# Patient Record
Sex: MALE | Race: WHITE | HISPANIC OR LATINO | Employment: FULL TIME | URBAN - METROPOLITAN AREA
[De-identification: names, ages, dates, MRNs, and addresses within clinical notes are randomized per-mention and may not be internally consistent; named-entity substitution may affect disease eponyms.]

---

## 2017-03-23 ENCOUNTER — ALLSCRIPTS OFFICE VISIT (OUTPATIENT)
Dept: OTHER | Facility: OTHER | Age: 52
End: 2017-03-23

## 2018-01-14 VITALS
SYSTOLIC BLOOD PRESSURE: 118 MMHG | BODY MASS INDEX: 26.67 KG/M2 | TEMPERATURE: 98.5 F | WEIGHT: 156.2 LBS | DIASTOLIC BLOOD PRESSURE: 76 MMHG | HEIGHT: 64 IN | HEART RATE: 84 BPM

## 2018-11-02 ENCOUNTER — OFFICE VISIT (OUTPATIENT)
Dept: FAMILY MEDICINE CLINIC | Facility: CLINIC | Age: 53
End: 2018-11-02
Payer: COMMERCIAL

## 2018-11-02 VITALS
SYSTOLIC BLOOD PRESSURE: 118 MMHG | HEART RATE: 72 BPM | BODY MASS INDEX: 26.4 KG/M2 | HEIGHT: 64 IN | WEIGHT: 154.6 LBS | DIASTOLIC BLOOD PRESSURE: 76 MMHG | TEMPERATURE: 98.4 F

## 2018-11-02 DIAGNOSIS — J30.89 NON-SEASONAL ALLERGIC RHINITIS, UNSPECIFIED TRIGGER: ICD-10-CM

## 2018-11-02 DIAGNOSIS — Z23 NEED FOR INFLUENZA VACCINATION: Primary | ICD-10-CM

## 2018-11-02 DIAGNOSIS — J01.00 ACUTE NON-RECURRENT MAXILLARY SINUSITIS: ICD-10-CM

## 2018-11-02 DIAGNOSIS — Z23 NEED FOR VACCINATION AGAINST STREPTOCOCCUS PNEUMONIAE: ICD-10-CM

## 2018-11-02 DIAGNOSIS — J45.20 MILD INTERMITTENT ASTHMA WITHOUT COMPLICATION: ICD-10-CM

## 2018-11-02 PROCEDURE — 99213 OFFICE O/P EST LOW 20 MIN: CPT | Performed by: FAMILY MEDICINE

## 2018-11-02 PROCEDURE — 1036F TOBACCO NON-USER: CPT | Performed by: FAMILY MEDICINE

## 2018-11-02 PROCEDURE — 90732 PPSV23 VACC 2 YRS+ SUBQ/IM: CPT

## 2018-11-02 PROCEDURE — 3008F BODY MASS INDEX DOCD: CPT | Performed by: FAMILY MEDICINE

## 2018-11-02 PROCEDURE — 90471 IMMUNIZATION ADMIN: CPT

## 2018-11-02 PROCEDURE — 90682 RIV4 VACC RECOMBINANT DNA IM: CPT

## 2018-11-02 PROCEDURE — 90472 IMMUNIZATION ADMIN EACH ADD: CPT

## 2018-11-02 RX ORDER — ALBUTEROL SULFATE 90 UG/1
2 AEROSOL, METERED RESPIRATORY (INHALATION) EVERY 4 HOURS PRN
Qty: 1 INHALER | Refills: 11 | Status: SHIPPED | OUTPATIENT
Start: 2018-11-02 | End: 2018-12-28 | Stop reason: SDUPTHER

## 2018-11-02 RX ORDER — TRIAMCINOLONE ACETONIDE 55 UG/1
2 SPRAY, METERED NASAL DAILY
Qty: 360 ACT | Refills: 0
Start: 2018-11-02 | End: 2018-12-28

## 2018-11-02 RX ORDER — AZITHROMYCIN 250 MG/1
TABLET, FILM COATED ORAL
Qty: 6 TABLET | Refills: 0 | Status: SHIPPED | OUTPATIENT
Start: 2018-11-02 | End: 2018-11-07

## 2018-11-02 RX ORDER — CETIRIZINE HYDROCHLORIDE 10 MG/1
10 TABLET ORAL DAILY
COMMUNITY
End: 2019-05-09 | Stop reason: ALTCHOICE

## 2018-11-02 RX ORDER — GUAIFENESIN 600 MG
1200 TABLET, EXTENDED RELEASE 12 HR ORAL EVERY 12 HOURS SCHEDULED
Qty: 40 TABLET | Refills: 0
Start: 2018-11-02 | End: 2018-11-12

## 2018-11-02 NOTE — PATIENT INSTRUCTIONS
Problem List Items Addressed This Visit        Respiratory    Allergic rhinitis - Primary     Patient does have some allergic rhinitis symptoms  It is likely that this is triggering some of the sinus symptoms that he also has  Would recommend Rhinocort, Nasonex, Nasacort, Flonase  He should choose 1, and use that till the 1st hard frost          Relevant Medications    Triamcinolone Acetonide 55 MCG/ACT AERO    Asthma     Asthma seems to be doing well at this point  If he keeps on top of the allergies, I would expect that he will do extremely well with the asthma  He did have side effects previously with Singulair, so we will trial just the nasal steroids, as well as having as needed albuterol available  Relevant Medications    albuterol (VENTOLIN HFA) 90 mcg/act inhaler      Other Visit Diagnoses     Acute non-recurrent maxillary sinusitis        Patient does have what appears to be a sinus infection along with allergies  Recommend Zithromax, Mucinex over-the-counter      Relevant Medications    Triamcinolone Acetonide 55 MCG/ACT AERO    azithromycin (ZITHROMAX) 250 mg tablet    guaiFENesin (MUCINEX) 600 mg 12 hr tablet

## 2018-11-02 NOTE — PROGRESS NOTES
Assessment/Plan:    Allergic rhinitis  Patient does have some allergic rhinitis symptoms  It is likely that this is triggering some of the sinus symptoms that he also has  Would recommend Rhinocort, Nasonex, Nasacort, Flonase  He should choose 1, and use that till the 1st hard frost     Asthma  Asthma seems to be doing well at this point  If he keeps on top of the allergies, I would expect that he will do extremely well with the asthma  He did have side effects previously with Singulair, so we will trial just the nasal steroids, as well as having as needed albuterol available  Diagnoses and all orders for this visit:    Non-seasonal allergic rhinitis, unspecified trigger  -     Triamcinolone Acetonide 55 MCG/ACT AERO; 2 Act (110 mcg total) into each nostril daily for 180 days    Mild intermittent asthma without complication  -     albuterol (VENTOLIN HFA) 90 mcg/act inhaler; Inhale 2 puffs every 4 (four) hours as needed for wheezing or shortness of breath    Acute non-recurrent maxillary sinusitis  Comments:  Patient does have what appears to be a sinus infection along with allergies  Recommend Zithromax, Mucinex over-the-counter  Orders:  -     Triamcinolone Acetonide 55 MCG/ACT AERO; 2 Act (110 mcg total) into each nostril daily for 180 days  -     azithromycin (ZITHROMAX) 250 mg tablet; Take 2 tablets on day 1, then 1 tablet daily days 2 through 5  -     guaiFENesin (MUCINEX) 600 mg 12 hr tablet; Take 2 tablets (1,200 mg total) by mouth every 12 (twelve) hours for 10 days    Other orders  -     cetirizine (ZyrTEC) 10 mg tablet; Take 10 mg by mouth daily          Subjective: pt is here for headache, fever, congetion,post nasal drip for approx  4 weeks~cd     Patient ID: Liza Kohler is a 46 y o  male  For the last month or so, the patient has been having a significant amount of congestion in the head, nasal congestion, postnasal drip  Runny nose is also present with this    Some face or tooth pain, occasional body aches  Has used Motrin so far  He has also tried Zyrtec  There has been a significant amount of symptoms for a while now that have been bothering him for this  Symptoms are the worst in the morning, but is present throughout the day  Asthma has been OK  He has not needed MDI's  Last use was a long time ago  The following portions of the patient's history were reviewed and updated as appropriate: allergies, current medications, past family history, past medical history, past social history, past surgical history and problem list     Review of Systems   Constitutional: Negative  HENT:        Per HPI   Respiratory: Positive for cough, shortness of breath and wheezing  Cardiovascular: Negative  Gastrointestinal: Negative  Objective:  Vitals:    11/02/18 0907   BP: 118/76   BP Location: Left arm   Patient Position: Sitting   Cuff Size: Standard   Pulse: 72   Temp: 98 4 °F (36 9 °C)   TempSrc: Oral   Weight: 70 1 kg (154 lb 9 6 oz)   Height: 5' 4" (1 626 m)                Physical Exam   Constitutional: He appears well-developed and well-nourished  HENT:   Head: Normocephalic and atraumatic  Right Ear: Hearing, tympanic membrane, external ear and ear canal normal    Left Ear: Hearing, tympanic membrane, external ear and ear canal normal    Some minor sinus tenderness, otherwise normal on head exam    Neck: Normal range of motion  Neck supple  Cardiovascular: Normal rate, regular rhythm and normal heart sounds  Exam reveals no gallop and no friction rub  No murmur heard  Pulses:       Carotid pulses are 2+ on the right side, and 2+ on the left side  Pulmonary/Chest: Effort normal and breath sounds normal  No respiratory distress  He has no wheezes  He has no rales  Nursing note and vitals reviewed

## 2018-11-02 NOTE — ASSESSMENT & PLAN NOTE
Asthma seems to be doing well at this point  If he keeps on top of the allergies, I would expect that he will do extremely well with the asthma  He did have side effects previously with Singulair, so we will trial just the nasal steroids, as well as having as needed albuterol available

## 2018-11-02 NOTE — ASSESSMENT & PLAN NOTE
Patient does have some allergic rhinitis symptoms  It is likely that this is triggering some of the sinus symptoms that he also has  Would recommend Rhinocort, Nasonex, Nasacort, Flonase    He should choose 1, and use that till the 1st hard frost

## 2018-12-28 ENCOUNTER — OFFICE VISIT (OUTPATIENT)
Dept: FAMILY MEDICINE CLINIC | Facility: CLINIC | Age: 53
End: 2018-12-28
Payer: COMMERCIAL

## 2018-12-28 ENCOUNTER — HOSPITAL ENCOUNTER (EMERGENCY)
Facility: HOSPITAL | Age: 53
Discharge: HOME | End: 2018-12-29
Attending: EMERGENCY MEDICINE | Admitting: EMERGENCY MEDICINE
Payer: COMMERCIAL

## 2018-12-28 ENCOUNTER — APPOINTMENT (EMERGENCY)
Dept: RADIOLOGY | Facility: HOSPITAL | Age: 53
End: 2018-12-28
Payer: COMMERCIAL

## 2018-12-28 ENCOUNTER — TELEPHONE (OUTPATIENT)
Dept: FAMILY MEDICINE CLINIC | Facility: CLINIC | Age: 53
End: 2018-12-28

## 2018-12-28 VITALS
HEIGHT: 64 IN | RESPIRATION RATE: 16 BRPM | SYSTOLIC BLOOD PRESSURE: 110 MMHG | DIASTOLIC BLOOD PRESSURE: 66 MMHG | TEMPERATURE: 97.9 F | BODY MASS INDEX: 26.8 KG/M2 | HEART RATE: 72 BPM | WEIGHT: 157 LBS

## 2018-12-28 DIAGNOSIS — J45.21 MILD INTERMITTENT ASTHMA WITH ACUTE EXACERBATION: Primary | ICD-10-CM

## 2018-12-28 DIAGNOSIS — J45.20 MILD INTERMITTENT ASTHMA WITHOUT COMPLICATION: ICD-10-CM

## 2018-12-28 DIAGNOSIS — J45.901 ASTHMA EXACERBATION: Primary | ICD-10-CM

## 2018-12-28 DIAGNOSIS — J20.9 ACUTE BRONCHITIS, UNSPECIFIED ORGANISM: ICD-10-CM

## 2018-12-28 LAB
ALBUMIN SERPL BCP-MCNC: 4.1 G/DL (ref 3.5–5)
ALP SERPL-CCNC: 79 U/L (ref 46–116)
ALT SERPL W P-5'-P-CCNC: 25 U/L (ref 12–78)
ANION GAP SERPL CALCULATED.3IONS-SCNC: 7 MMOL/L (ref 4–13)
AST SERPL W P-5'-P-CCNC: 17 U/L (ref 5–45)
BASOPHILS # BLD AUTO: 0.05 THOUSANDS/ΜL (ref 0–0.1)
BASOPHILS NFR BLD AUTO: 0 % (ref 0–1)
BILIRUB SERPL-MCNC: 0.48 MG/DL (ref 0.2–1)
BUN SERPL-MCNC: 19 MG/DL (ref 5–25)
CALCIUM SERPL-MCNC: 9.5 MG/DL (ref 8.3–10.1)
CHLORIDE SERPL-SCNC: 107 MMOL/L (ref 100–108)
CO2 SERPL-SCNC: 26 MMOL/L (ref 21–32)
CREAT SERPL-MCNC: 1.23 MG/DL (ref 0.6–1.3)
EOSINOPHIL # BLD AUTO: 0.01 THOUSAND/ΜL (ref 0–0.61)
EOSINOPHIL NFR BLD AUTO: 0 % (ref 0–6)
ERYTHROCYTE [DISTWIDTH] IN BLOOD BY AUTOMATED COUNT: 11.2 % (ref 11.6–15.1)
GFR SERPL CREATININE-BSD FRML MDRD: 66 ML/MIN/1.73SQ M
GLUCOSE SERPL-MCNC: 133 MG/DL (ref 65–140)
HCT VFR BLD AUTO: 43.5 % (ref 36.5–49.3)
HGB BLD-MCNC: 14.2 G/DL (ref 12–17)
IMM GRANULOCYTES # BLD AUTO: 0.07 THOUSAND/UL (ref 0–0.2)
IMM GRANULOCYTES NFR BLD AUTO: 0 % (ref 0–2)
LYMPHOCYTES # BLD AUTO: 0.51 THOUSANDS/ΜL (ref 0.6–4.47)
LYMPHOCYTES NFR BLD AUTO: 3 % (ref 14–44)
MCH RBC QN AUTO: 28.1 PG (ref 26.8–34.3)
MCHC RBC AUTO-ENTMCNC: 32.6 G/DL (ref 31.4–37.4)
MCV RBC AUTO: 86 FL (ref 82–98)
MONOCYTES # BLD AUTO: 0.42 THOUSAND/ΜL (ref 0.17–1.22)
MONOCYTES NFR BLD AUTO: 3 % (ref 4–12)
NEUTROPHILS # BLD AUTO: 14.65 THOUSANDS/ΜL (ref 1.85–7.62)
NEUTS SEG NFR BLD AUTO: 94 % (ref 43–75)
NRBC BLD AUTO-RTO: 0 /100 WBCS
PLATELET # BLD AUTO: 250 THOUSANDS/UL (ref 149–390)
PMV BLD AUTO: 10.7 FL (ref 8.9–12.7)
POTASSIUM SERPL-SCNC: 4.3 MMOL/L (ref 3.5–5.3)
PROT SERPL-MCNC: 7.8 G/DL (ref 6.4–8.2)
RBC # BLD AUTO: 5.06 MILLION/UL (ref 3.88–5.62)
SODIUM SERPL-SCNC: 140 MMOL/L (ref 136–145)
TROPONIN I SERPL-MCNC: <0.02 NG/ML
WBC # BLD AUTO: 15.71 THOUSAND/UL (ref 4.31–10.16)

## 2018-12-28 PROCEDURE — 96374 THER/PROPH/DIAG INJ IV PUSH: CPT

## 2018-12-28 PROCEDURE — 80053 COMPREHEN METABOLIC PANEL: CPT | Performed by: EMERGENCY MEDICINE

## 2018-12-28 PROCEDURE — 71046 X-RAY EXAM CHEST 2 VIEWS: CPT

## 2018-12-28 PROCEDURE — 84484 ASSAY OF TROPONIN QUANT: CPT | Performed by: EMERGENCY MEDICINE

## 2018-12-28 PROCEDURE — 85025 COMPLETE CBC W/AUTO DIFF WBC: CPT | Performed by: EMERGENCY MEDICINE

## 2018-12-28 PROCEDURE — 93005 ELECTROCARDIOGRAM TRACING: CPT

## 2018-12-28 PROCEDURE — 99213 OFFICE O/P EST LOW 20 MIN: CPT | Performed by: FAMILY MEDICINE

## 2018-12-28 PROCEDURE — 99284 EMERGENCY DEPT VISIT MOD MDM: CPT

## 2018-12-28 PROCEDURE — 94640 AIRWAY INHALATION TREATMENT: CPT

## 2018-12-28 PROCEDURE — 36415 COLL VENOUS BLD VENIPUNCTURE: CPT | Performed by: EMERGENCY MEDICINE

## 2018-12-28 PROCEDURE — 96361 HYDRATE IV INFUSION ADD-ON: CPT

## 2018-12-28 RX ORDER — ALBUTEROL SULFATE 2.5 MG/3ML
5 SOLUTION RESPIRATORY (INHALATION) ONCE
Status: COMPLETED | OUTPATIENT
Start: 2018-12-28 | End: 2018-12-28

## 2018-12-28 RX ORDER — AZITHROMYCIN 500 MG/1
500 TABLET, FILM COATED ORAL DAILY
Qty: 5 TABLET | Refills: 0 | Status: SHIPPED | OUTPATIENT
Start: 2018-12-28 | End: 2019-01-02 | Stop reason: ALTCHOICE

## 2018-12-28 RX ORDER — PREDNISONE 20 MG/1
20 TABLET ORAL DAILY
Qty: 5 TABLET | Refills: 0 | Status: SHIPPED | OUTPATIENT
Start: 2018-12-28 | End: 2019-01-02 | Stop reason: ALTCHOICE

## 2018-12-28 RX ORDER — ALBUTEROL SULFATE 90 UG/1
2 AEROSOL, METERED RESPIRATORY (INHALATION) EVERY 6 HOURS PRN
Qty: 1 INHALER | Refills: 2 | Status: SHIPPED | OUTPATIENT
Start: 2018-12-28 | End: 2019-12-28

## 2018-12-28 RX ORDER — METHYLPREDNISOLONE SODIUM SUCCINATE 125 MG/2ML
125 INJECTION, POWDER, LYOPHILIZED, FOR SOLUTION INTRAMUSCULAR; INTRAVENOUS ONCE
Status: COMPLETED | OUTPATIENT
Start: 2018-12-28 | End: 2018-12-28

## 2018-12-28 RX ADMIN — SODIUM CHLORIDE 1000 ML: 0.9 INJECTION, SOLUTION INTRAVENOUS at 22:18

## 2018-12-28 RX ADMIN — IPRATROPIUM BROMIDE 0.5 MG: 0.5 SOLUTION RESPIRATORY (INHALATION) at 22:18

## 2018-12-28 RX ADMIN — METHYLPREDNISOLONE SODIUM SUCCINATE 125 MG: 125 INJECTION, POWDER, FOR SOLUTION INTRAMUSCULAR; INTRAVENOUS at 22:18

## 2018-12-28 RX ADMIN — ALBUTEROL SULFATE 5 MG: 2.5 SOLUTION RESPIRATORY (INHALATION) at 22:18

## 2018-12-28 NOTE — PATIENT INSTRUCTIONS

## 2018-12-28 NOTE — PROGRESS NOTES
Assessment/Plan:     Diagnoses and all orders for this visit:    Mild intermittent asthma with acute exacerbation  Comments:  Mild exacerbation, to start steroid if not better and antibiotic, advised on well hydration, to return to the office if worsening to continue with albuterol inh  Orders:  -     azithromycin (ZITHROMAX) 500 MG tablet; Take 1 tablet (500 mg total) by mouth daily for 5 days    Acute bronchitis, unspecified organism  Comments:  Started on a Z-Jamal empirically  To return to the office if not better  Orders:  -     predniSONE 20 mg tablet; Take 1 tablet (20 mg total) by mouth daily          Subjective: Pt here with complains of asthma attack flare up  Pt states he has been using his inhaler more often  GEO Velasco     Patient ID: Marta Tamez is a 46 y o  male  Chest congestion for the last 5 days , pt using his inhaler more frequently, no fever ir   chills ,feel chest tightness   Pt has nasal congestion, ear plugged , sore throat,   Worsen at night   Pt with h/o asthma ,no recent exacerbation  The following portions of the patient's history were reviewed and updated as appropriate: allergies, current medications, past family history, past medical history, past social history, past surgical history and problem list     Review of Systems   Constitutional: Positive for activity change, appetite change, fatigue and fever  HENT: Positive for ear pain, postnasal drip, rhinorrhea, sinus pain, sinus pressure, sore throat and voice change  Negative for congestion, facial swelling, hearing loss, mouth sores, sneezing and trouble swallowing  Respiratory: Positive for cough  Negative for wheezing  Cardiovascular: Negative for chest pain and leg swelling  Gastrointestinal: Negative for abdominal pain, blood in stool, diarrhea, nausea and vomiting  Musculoskeletal: Negative for back pain, joint swelling, myalgias and neck pain  Skin: Negative for color change and rash     Neurological: Negative for headaches  Objective:      Vitals:    12/28/18 1022   BP: 110/66   BP Location: Left arm   Patient Position: Sitting   Cuff Size: Standard   Pulse: 72   Resp: 16   Temp: 97 9 °F (36 6 °C)   TempSrc: Tympanic   Weight: 71 2 kg (157 lb)   Height: 5' 4" (1 626 m)      Physical Exam   Constitutional: He is oriented to person, place, and time  He appears well-developed and well-nourished  HENT:   Head: Normocephalic and atraumatic  Right Ear: Hearing, tympanic membrane, external ear and ear canal normal    Left Ear: Hearing, tympanic membrane, external ear and ear canal normal    Nose: No rhinorrhea, sinus tenderness or nasal deformity  Right sinus exhibits no maxillary sinus tenderness and no frontal sinus tenderness  Left sinus exhibits no maxillary sinus tenderness and no frontal sinus tenderness  Mouth/Throat: Mucous membranes are normal  Posterior oropharyngeal edema and posterior oropharyngeal erythema present  No oropharyngeal exudate or tonsillar abscesses  Eyes: Conjunctivae are normal    Neck: Normal range of motion  Neck supple  Cardiovascular: Normal rate, regular rhythm and normal heart sounds  Pulmonary/Chest: Effort normal and breath sounds normal    Abdominal: Bowel sounds are normal    Musculoskeletal: Normal range of motion  Neurological: He is alert and oriented to person, place, and time  Skin: Skin is warm and dry  Psychiatric: He has a normal mood and affect  Nursing note and vitals reviewed

## 2018-12-28 NOTE — TELEPHONE ENCOUNTER
Patient got home after appointment and realized he was a lot lower than he thought with his Albuterol  He was wondering if this could be called into the [facility] on [address] [facility]  Thank you!

## 2018-12-28 NOTE — LETTER
December 28, 2018     Patient: Елена Gustafson   YOB: 1965   Date of Visit: 12/28/2018       To Whom it May Concern:    Елена Gustafson is under my professional care  He was seen in my office on 12/28/2018  He may return to work on 1/2/2019  If you have any questions or concerns, please don't hesitate to call           Sincerely,          Mario Ramos MD        CC: No Recipients

## 2018-12-29 VITALS
HEIGHT: 64 IN | OXYGEN SATURATION: 96 % | RESPIRATION RATE: 18 BRPM | TEMPERATURE: 97.8 F | DIASTOLIC BLOOD PRESSURE: 59 MMHG | WEIGHT: 157 LBS | BODY MASS INDEX: 26.8 KG/M2 | HEART RATE: 116 BPM | SYSTOLIC BLOOD PRESSURE: 105 MMHG

## 2018-12-29 VITALS
TEMPERATURE: 98 F | OXYGEN SATURATION: 94 % | WEIGHT: 157 LBS | HEART RATE: 120 BPM | RESPIRATION RATE: 20 BRPM | BODY MASS INDEX: 26.8 KG/M2 | HEIGHT: 64 IN | SYSTOLIC BLOOD PRESSURE: 129 MMHG | DIASTOLIC BLOOD PRESSURE: 75 MMHG

## 2018-12-29 DIAGNOSIS — R00.2 PALPITATIONS: Primary | ICD-10-CM

## 2018-12-29 LAB
ATRIAL RATE: 101 BPM
ATRIAL RATE: 113 BPM
DEPRECATED D DIMER PPP: 235 NG/ML (FEU)
P AXIS: 47 DEGREES
P AXIS: 60 DEGREES
PR INTERVAL: 118 MS
PR INTERVAL: 118 MS
QRS AXIS: -2 DEGREES
QRS AXIS: 29 DEGREES
QRSD INTERVAL: 72 MS
QRSD INTERVAL: 84 MS
QT INTERVAL: 312 MS
QT INTERVAL: 326 MS
QTC INTERVAL: 420 MS
QTC INTERVAL: 427 MS
T WAVE AXIS: 26 DEGREES
T WAVE AXIS: 52 DEGREES
VENTRICULAR RATE: 100 BPM
VENTRICULAR RATE: 113 BPM

## 2018-12-29 PROCEDURE — 99285 EMERGENCY DEPT VISIT HI MDM: CPT

## 2018-12-29 PROCEDURE — 93005 ELECTROCARDIOGRAM TRACING: CPT

## 2018-12-29 PROCEDURE — 85379 FIBRIN DEGRADATION QUANT: CPT | Performed by: EMERGENCY MEDICINE

## 2018-12-29 PROCEDURE — 93010 ELECTROCARDIOGRAM REPORT: CPT | Performed by: INTERNAL MEDICINE

## 2018-12-29 PROCEDURE — 36415 COLL VENOUS BLD VENIPUNCTURE: CPT | Performed by: EMERGENCY MEDICINE

## 2018-12-29 RX ORDER — PREDNISONE 20 MG/1
40 TABLET ORAL DAILY
Qty: 10 TABLET | Refills: 0 | Status: SHIPPED | OUTPATIENT
Start: 2018-12-29 | End: 2019-01-02 | Stop reason: ALTCHOICE

## 2018-12-29 RX ORDER — ALBUTEROL SULFATE 2.5 MG/3ML
5 SOLUTION RESPIRATORY (INHALATION) ONCE
Status: COMPLETED | OUTPATIENT
Start: 2018-12-29 | End: 2018-12-29

## 2018-12-29 RX ORDER — ALBUTEROL SULFATE 2.5 MG/3ML
2.5 SOLUTION RESPIRATORY (INHALATION) EVERY 6 HOURS PRN
Qty: 75 ML | Refills: 0 | Status: SHIPPED | OUTPATIENT
Start: 2018-12-29 | End: 2019-01-02 | Stop reason: SDUPTHER

## 2018-12-29 RX ADMIN — IPRATROPIUM BROMIDE 0.5 MG: 0.5 SOLUTION RESPIRATORY (INHALATION) at 00:58

## 2018-12-29 RX ADMIN — ALBUTEROL SULFATE 5 MG: 2.5 SOLUTION RESPIRATORY (INHALATION) at 00:58

## 2018-12-29 NOTE — DISCHARGE INSTRUCTIONS
Asthma, Ambulatory Care   GENERAL INFORMATION:   Asthma  is a lung disease that makes breathing difficult  Chronic inflammation and reactions to triggers narrow the airways in your lungs  Asthma can become life-threatening if it is not managed  Common symptoms include the following:   · Coughing     · Wheezing     · Shortness of breath     · Chest tightness  Seek immediate care for the following symptoms:   · Severe shortness of breath    · Blue or gray lips or nails    · Skin around your neck and ribs pulls in with each breath    · Shortness of breath, even after you take your short-term medicine as directed     · Peak flow numbers in the red zone of your asthma action plan  Treatment for asthma  will depend on how severe it is  Medicine may decrease inflammation, open airways, and make it easier to breathe  Medicines may be inhaled, taken as a pill, or injected  Short-term medicines relieve your symptoms quickly  Long-term medicines are used to prevent future attacks  You may also need medicine to help control your allergies  Manage and prevent future asthma attacks:   · Follow your asthma action pan  This is a written plan that you and your healthcare provider create  It explains which medicine you need and when to change doses if necessary  It also explains how you can monitor symptoms and use a peak flow meter  The meter measures how well your lungs are working  · Manage other health conditions , such as allergies, acid reflux, and sleep apnea  · Identify and avoid triggers  These may include pets, dust mites, mold, and cockroaches  · Do not smoke and avoid others who smoke  If you smoke, it is never too late to quit  Ask your healthcare provider if you need help quitting  · Ask about a flu vaccine  The flu can make your asthma worse  You may need a yearly flu shot  Follow up with your healthcare provider as directed:   You will need to return to make sure your medicine is working and your symptoms are controlled  You may be referred to an asthma or allergy specialist  Aurora Dwyer may be asked to keep a record of your peak flow values and bring it with you to your appointments  Write down your questions so you remember to ask them during your visits  CARE AGREEMENT:   You have the right to help plan your care  Learn about your health condition and how it may be treated  Discuss treatment options with your caregivers to decide what care you want to receive  You always have the right to refuse treatment  The above information is an  only  It is not intended as medical advice for individual conditions or treatments  Talk to your doctor, nurse or pharmacist before following any medical regimen to see if it is safe and effective for you  © 2014 [address] is for End User's use only and may not be sold, redistributed or otherwise used for commercial purposes  All illustrations and images included in CareNotes® are the copyrighted property of [employer]  or [employer] Ley[employer]

## 2018-12-29 NOTE — ED ATTENDING ATTESTATION
Mikie Michaels MD, saw and evaluated the patient  I have discussed the patient with the resident/non-physician practitioner and agree with the resident's/non-physician practitioner's findings, Plan of Care, and MDM as documented in the resident's/non-physician practitioner's note, except where noted  All available labs and Radiology studies were reviewed  At this point I agree with the current assessment done in the Emergency Department  I have conducted an independent evaluation of this patient a history and physical is as follows:    Patient here with rapid heart rate  Patient was seen last night for asthma exacerbation, given 10 mg of albuterol  Was discharged 5 hr ago  Since that time, patient has had ongoing rapid heart rate  His dyspnea is improved, but still mildly present  The patient states his asthma is usually not this bad  Denies fevers, chest pain, or lateralizing limb swelling, or PE risk factors  His review of systems is negative in 12 systems reviewed  On exam the patient is tachycardic with an otherwise nonfocal exam   Impression:  Sinus tachycardia    Will plan to do D-dimer, observe, EKG  Critical Care Time  CritCare Time    Procedures

## 2018-12-29 NOTE — ED ATTENDING ATTESTATION
Britton Tapia MD, saw and evaluated the patient  All available labs and X-rays were ordered by me or the resident and have been reviewed by myself  I discussed the patient with the resident / non-physician and agree with the resident's / non-physician practitioner's findings and plan as documented in the resident's / non-physician practicitioner's note, except where noted  At this point, I agree with the current assessment done in the ED  Chief Complaint   Patient presents with    Asthma     Pt states he was at the family doctor earlier today and was given prednisone and inhaler, but it is not helping,  Started with difficulty breathing on [place] stephanie and has gotten worse since then  This is a 58-year-old male presenting for evaluation of shortness of breath  The patient states that he has about a 5 or 7 year history of asthma  He would use a puffer every now and then  He was doing well until [place] Stephanie when he started to have cough congestion and sensation of chest tightness with shortness of breath  It has been continuous from then until now, feels much better if he is walking around and exerting himself  He feels a little bit worse if he is lying flat  He denies any fevers chills nausea vomiting  Denies chest pain, just feeling of chest tightness like he has had during previous asthma exacerbations  Denies arm pain jaw pain back pain  Denies leg swelling  Denies new medications  Denies long drives  Denies smoking drinking or drugs  He was seen in the office today and was diagnosed mild intermittent acute asthma and treated with azithromycin, prednisone  The patient had no improvement in symptoms and so came in here for evaluation    PE:  Vitals:    12/28/18 2230 12/28/18 2300 12/29/18 0015 12/29/18 0100   BP: (!) 191/104 158/84 146/83 129/75   BP Location: Left arm Right arm Right arm Right arm   Pulse: 94 95 (!) 122 (!) 120   Resp: (!) 23 21 19 20   Temp:       TempSrc: SpO2: 99% 96% 95% 94%   Weight:       Height:       General: VSS, NAD, awake, alert  Well-nourished, well-developed  Appears stated age  Speaking normally in full sentences  Head: Normocephalic, atraumatic, nontender  Eyes: PERRL, EOM-I  No diplopia  No hyphema  No subconjunctival hemorrhages  Symmetrical lids  ENT: Atraumatic external nose and ears  MMM  No malocclusion  No stridor  Normal phonation  No drooling  Normal swallowing  Neck: Symmetric, trachea midline  No JVD  CV: tachycardic () +S1/S2  No murmurs or gallops  Peripheral pulses +2 throughout  No chest wall tenderness  Lungs:   Unlabored No retractions  Mild wheezing posteriorly but poor air movement  lungs sounds equal bilateral    No tachypnea  Abd: +BS, soft, NT/ND    MSK:   FROM   Back:   No rashes  Skin: Dry, intact  Neuro: AAOx3, GCS 15, CN II-XII grossly intact  Motor grossly intact  Psychiatric/Behavioral: Appropriate mood and affect   Exam: deferred  A:  - dyspnea  - chest tightness  P:  - steroids  - nebs  - CXR  - labs (cardiac)  - re-eval    - 13 point ROS was performed and all are normal unless stated in the history above  - Nursing note reviewed  Vitals reviewed  - Orders placed by myself and/or advanced practitioner / resident     - Previous chart was reviewed  - No language barrier    - History obtained from patient  - There are no limitations to the history obtained  - Critical care time: Not applicable for this patient  Final Diagnosis:  1   Asthma exacerbation         Medications   albuterol inhalation solution 5 mg (5 mg Nebulization Given 12/28/18 2218)   ipratropium (ATROVENT) 0 02 % inhalation solution 0 5 mg (0 5 mg Nebulization Given 12/28/18 2218)   methylPREDNISolone sodium succinate (Solu-MEDROL) injection 125 mg (125 mg Intravenous Given 12/28/18 2218)   sodium chloride 0 9 % bolus 1,000 mL (0 mL Intravenous Stopped 12/29/18 0010)   albuterol inhalation solution 5 mg (5 mg Nebulization Given 12/29/18 0058)   ipratropium (ATROVENT) 0 02 % inhalation solution 0 5 mg (0 5 mg Nebulization Given 12/29/18 0058)     XR chest 2 views   ED Interpretation   No active pulmonary disease      Final Result      No acute cardiopulmonary disease  Workstation performed: [id number]           Orders Placed This Encounter   Procedures    XR chest 2 views    CBC and differential    Comprehensive metabolic panel    Troponin I    ECG 12 lead    ECG 12 lead     Labs Reviewed   CBC AND DIFFERENTIAL - Abnormal        Result Value Ref Range Status    WBC 15 71 (*) 4 31 - 10 16 Thousand/uL Final    RBC 5 06  3 88 - 5 62 Million/uL Final    Hemoglobin 14 2  12 0 - 17 0 g/dL Final    Hematocrit 43 5  36 5 - 49 3 % Final    MCV 86  82 - 98 fL Final    MCH 28 1  26 8 - 34 3 pg Final    MCHC 32 6  31 4 - 37 4 g/dL Final    RDW 11 2 (*) 11 6 - 15 1 % Final    MPV 10 7  8 9 - 12 7 fL Final    Platelets 269  408 - 390 Thousands/uL Final    nRBC 0  /100 WBCs Final    Comment: This is an appended report  These results have been appended to a previously preliminary verified report  Neutrophils Relative 94 (*) 43 - 75 % Final    Immat GRANS % 0  0 - 2 % Final    Lymphocytes Relative 3 (*) 14 - 44 % Final    Monocytes Relative 3 (*) 4 - 12 % Final    Eosinophils Relative 0  0 - 6 % Final    Basophils Relative 0  0 - 1 % Final    Neutrophils Absolute 14 65 (*) 1 85 - 7 62 Thousands/µL Final    Immature Grans Absolute 0 07  0 00 - 0 20 Thousand/uL Final    Lymphocytes Absolute 0 51 (*) 0 60 - 4 47 Thousands/µL Final    Monocytes Absolute 0 42  0 17 - 1 22 Thousand/µL Final    Eosinophils Absolute 0 01  0 00 - 0 61 Thousand/µL Final    Basophils Absolute 0 05  0 00 - 0 10 Thousands/µL Final    Narrative: This is an appended report  These results have been appended to a previously verified report     TROPONIN I - Normal    Troponin I <0 02  <=0 04 ng/mL Final    Comment:   [employer] Chemistry analyzer 99% cutoff is > 0 04 ng/mL in network labs     o cTnI 99% cutoff is useful only when applied to patients in the clinical setting of myocardial ischemia   o cTnI 99% cutoff should be interpreted in the context of clinical history, ECG findings and possibly cardiac imaging to establish correct diagnosis  o cTnI 99% cutoff may be suggestive but clearly not indicative of a coronary event without the clinical setting of myocardial ischemia  COMPREHENSIVE METABOLIC PANEL    Sodium 007  136 - 145 mmol/L Final    Potassium 4 3  3 5 - 5 3 mmol/L Final    Chloride 107  100 - 108 mmol/L Final    CO2 26  21 - 32 mmol/L Final    ANION GAP 7  4 - 13 mmol/L Final    BUN 19  5 - 25 mg/dL Final    Creatinine 1 23  0 60 - 1 30 mg/dL Final    Comment: Standardized to IDMS reference method    Glucose 133  65 - 140 mg/dL Final    Comment:   If the patient is fasting, the ADA then defines impaired fasting glucose as > 100 mg/dL and diabetes as > or equal to 123 mg/dL  Specimen collection should occur prior to Sulfasalazine administration due to the potential for falsely depressed results  Specimen collection should occur prior to Sulfapyridine administration due to the potential for falsely elevated results  Calcium 9 5  8 3 - 10 1 mg/dL Final    AST 17  5 - 45 U/L Final    Comment:   Specimen collection should occur prior to Sulfasalazine administration due to the potential for falsely depressed results  ALT 25  12 - 78 U/L Final    Comment:   Specimen collection should occur prior to Sulfasalazine and/or Sulfapyridine administration due to the potential for falsely depressed results       Alkaline Phosphatase 79  46 - 116 U/L Final    Total Protein 7 8  6 4 - 8 2 g/dL Final    Albumin 4 1  3 5 - 5 0 g/dL Final    Total Bilirubin 0 48  0 20 - 1 00 mg/dL Final    eGFR 66  ml/min/1 73sq m Final    Narrative:     National Kidney Disease Education Program recommendations are as follows:  GFR calculation is accurate only with a steady state creatinine  Chronic Kidney disease less than 60 ml/min/1 73 sq  meters  Kidney failure less than 15 ml/min/1 73 sq  meters  Time reflects when diagnosis was documented in both MDM as applicable and the Disposition within this note     Time User Action Codes Description Comment    12/29/2018  1:35 AM Kartik Valente Add [J45 901] Asthma exacerbation       ED Disposition     ED Disposition Condition Comment    Discharge  Teetee Bones discharge to home/self care  Condition at discharge: Stable        Follow-up Information     Follow up With Specialties Details Why 725 Camila Peoples MD [facility]   [address] [place] [id number]  [phone]          Discharge Medication List as of 12/29/2018  1:41 AM      START taking these medications    Details   ! ! predniSONE 20 mg tablet Take 2 tablets (40 mg total) by mouth daily for 5 days, Starting Sat 12/29/2018, Until Thu 1/3/2019, Print       !! - Potential duplicate medications found  Please discuss with provider  CONTINUE these medications which have NOT CHANGED    Details   albuterol (VENTOLIN HFA) 90 mcg/act inhaler Inhale 2 puffs every 6 (six) hours as needed for wheezing or shortness of breath, Starting Fri 12/28/2018, Until Sat 12/28/2019, Normal      azithromycin (ZITHROMAX) 500 MG tablet Take 1 tablet (500 mg total) by mouth daily for 5 days, Starting Fri 12/28/2018, Until Wed 1/2/2019, Normal      cetirizine (ZyrTEC) 10 mg tablet Take 10 mg by mouth daily, Historical Med      !! predniSONE 20 mg tablet Take 1 tablet (20 mg total) by mouth daily, Starting Fri 12/28/2018, Normal       !! - Potential duplicate medications found  Please discuss with provider  No discharge procedures on file  Prior to Admission Medications   Prescriptions Last Dose Informant Patient Reported? Taking?    albuterol (VENTOLIN HFA) 90 mcg/act inhaler   No Yes   Sig: Inhale 2 puffs every 6 (six) hours as needed for wheezing or shortness of breath   azithromycin (ZITHROMAX) 500 MG tablet   No Yes   Sig: Take 1 tablet (500 mg total) by mouth daily for 5 days   cetirizine (ZyrTEC) 10 mg tablet  Self Yes Yes   Sig: Take 10 mg by mouth daily   predniSONE 20 mg tablet   No Yes   Sig: Take 1 tablet (20 mg total) by mouth daily      Facility-Administered Medications: None       Portions of the record may have been created with voice recognition software  Occasional wrong word or "sound a like" substitutions may have occurred due to the inherent limitations of voice recognition software  Read the chart carefully and recognize, using context, where substitutions have occurred      Electronically signed by:  Shant Puckett

## 2018-12-29 NOTE — DISCHARGE INSTRUCTIONS
Continue with your previously prescribed medications including albuterol and prednisone for your previous asthma flare symptoms  Return if he developed any worsening chest pain shortness of breath fevers or any other concerning symptoms  Follow up with the primary care doctor to 3 days for re-evaluation  Can be expected to have continued palpitations or fast heart rate symptoms with the use of these medications  Heart Palpitations   WHAT YOU NEED TO KNOW:   Heart palpitations are feelings that your heart races, jumps, throbs, or flutters  You may feel extra beats, no beats for a short time, or skipped beats  You may have these feelings in your chest, throat, or neck  They may happen when you are sitting, standing, or lying  Heart palpitations may be frightening, but are usually not caused by a serious problem  DISCHARGE INSTRUCTIONS:   Call 911 or have someone else call for any of the following:   · You have any of the following signs of a heart attack:      ¨ Squeezing, pressure, or pain in your chest that lasts longer than 5 minutes or returns    ¨ Discomfort or pain in your back, neck, jaw, stomach, or arm     ¨ Trouble breathing    ¨ Nausea or vomiting    ¨ Lightheadedness or a sudden cold sweat, especially with chest pain or trouble breathing    · You have any of the following signs of a stroke:      ¨ Numbness or drooping on one side of your face     ¨ Weakness in an arm or leg    ¨ Confusion or difficulty speaking    ¨ Dizziness, a severe headache, or vision loss    · You faint or lose consciousness  Return to the emergency department if:   · Your palpitations happen more often or get more intense  Contact your healthcare provider if:   · You have new or worsening swelling in your feet or ankles  · You have questions or concerns about your condition or care  Follow up with your healthcare provider as directed:   You may need to follow up with a cardiologist  Julio Horowitz may need tests to check for heart problems that cause palpitations  Write down your questions so you remember to ask them during your visits  Keep a record:  Write down when your palpitations start and stop, what you were doing when they started, and your symptoms  Keep track of what you ate or drank within a few hours of your palpitations  Include anything that seemed to help your symptoms, such as lying down or holding your breath  This record will help you and your healthcare provider learn what triggers your palpitations  Bring this record with you to your follow up visits  Help prevent heart palpitations:   · Manage stress and anxiety  Find ways to relax such as listening to music, meditating, or doing yoga  Exercise can also help decrease stress and anxiety  Talk to someone you trust about your stress or anxiety  You can also talk to a therapist      · Get plenty of sleep every night  Ask your healthcare provider how much sleep you need each night  · Do not drink caffeine or alcohol  Caffeine and alcohol can make your palpitations worse  Caffeine is found in soda, coffee, tea, chocolate, and drinks that increase your energy  · Do not smoke  Nicotine and other chemicals in cigarettes and cigars may damage your heart and blood vessels  Ask your healthcare provider for information if you currently smoke and need help to quit  E-cigarettes or smokeless tobacco still contain nicotine  Talk to your healthcare provider before you use these products  · Do not use illegal drugs  Talk to your healthcare provider if you use illegal drugs and want help to quit  © 2017 [address] [employer] Information is for End User's use only and may not be sold, redistributed or otherwise used for commercial purposes  All illustrations and images included in CareNotes® are the copyrighted property of [employer] , [employer]  or Herb Ley[employer]  The above information is an  only   It is not intended as medical advice for individual conditions or treatments  Talk to your doctor, nurse or pharmacist before following any medical regimen to see if it is safe and effective for you

## 2018-12-29 NOTE — ED PROVIDER NOTES
History  Chief Complaint   Patient presents with    Rapid Heart Rate     pt reported "I was here about 5 hrs ago, treated with albuterol for my asthma, when I left I was jittery, but at home I felt my heart racing  they told me some of this is normal, I just got concerned"     70-year-old male history of asthma presents for rapid heart rates/palpitations  Patient was seen in the emergency department yesterday evening for an asthma flare treated with 2 treatments of 5/0 5 albuterol and ipratropium  Upon discharge his asthma has resolved, no more wheezing or shortness of breath and had a heart rate of 120  He intermittently had palpitations upon discharge but when he went home he had persistent sensation of palpitations/fast heart rate  No near syncope/syncope  No chest pain no return of wheezing or shortness of breath  He has a history of asthma however has never had to come to the hospital because of his symptoms  He does not take a maintenance inhaler but does intermittently take albuterol rescue inhaler which she has done more frequently prior to yesterday  He was seen in urgent care and given steroids and azithromycin but had no relief of symptoms  He has no history of DVT or PE or hemoptysis  No history of SVT in no family history of heart disease  Prior to Admission Medications   Prescriptions Last Dose Informant Patient Reported? Taking?    albuterol (2 5 mg/3 mL) 0 083 % nebulizer solution   No Yes   Sig: Take 1 vial (2 5 mg total) by nebulization every 6 (six) hours as needed for wheezing or shortness of breath   albuterol (VENTOLIN HFA) 90 mcg/act inhaler   No Yes   Sig: Inhale 2 puffs every 6 (six) hours as needed for wheezing or shortness of breath   azithromycin (ZITHROMAX) 500 MG tablet   No Yes   Sig: Take 1 tablet (500 mg total) by mouth daily for 5 days   cetirizine (ZyrTEC) 10 mg tablet  Self Yes Yes   Sig: Take 10 mg by mouth daily   predniSONE 20 mg tablet   No Yes   Sig: Take 1 tablet (20 mg total) by mouth daily   predniSONE 20 mg tablet   No Yes   Sig: Take 2 tablets (40 mg total) by mouth daily for 5 days      Facility-Administered Medications: None       Past Medical History:   Diagnosis Date    Asthma        History reviewed  No pertinent surgical history  Family History   Problem Relation Age of Onset    Diabetes Mother     Hypertension Mother     Thyroid disease Mother         thyroid disorder    Prostate cancer Father      I have reviewed and agree with the history as documented  Social History   Substance Use Topics    Smoking status: Never Smoker    Smokeless tobacco: Never Used    Alcohol use No      Comment: social drinker per [employer]         Review of Systems   Constitutional: Negative for chills, fatigue and fever  HENT: Negative for congestion, rhinorrhea and sinus pressure  Eyes: Negative for visual disturbance  Respiratory: Positive for cough and wheezing  Negative for chest tightness and shortness of breath  Cardiovascular: Positive for palpitations  Negative for chest pain and leg swelling  Gastrointestinal: Negative for abdominal pain, blood in stool, constipation, diarrhea, nausea and vomiting  Genitourinary: Negative for dysuria, flank pain and frequency  Musculoskeletal: Negative for back pain and neck pain  Skin: Negative for rash  Neurological: Negative for dizziness, weakness, light-headedness, numbness and headaches  Hematological: Negative for adenopathy         Physical Exam  ED Triage Vitals   Temperature Pulse Respirations Blood Pressure SpO2   12/29/18 0722 12/29/18 0722 12/29/18 0722 12/29/18 0722 12/29/18 0722   97 8 °F (36 6 °C) (!) 131 18 116/76 97 %      Temp Source Heart Rate Source Patient Position - Orthostatic VS BP Location FiO2 (%)   12/29/18 0722 12/29/18 0722 12/29/18 0722 12/29/18 0722 --   Tympanic Monitor Sitting Right arm       Pain Score       12/29/18 0745       No Pain           Orthostatic Vital Signs  Vitals:    12/29/18 0722 12/29/18 0745 12/29/18 0800   BP: 116/76 128/72 105/59   Pulse: (!) 131 (!) 120 (!) 116   Patient Position - Orthostatic VS: Sitting Sitting Sitting       Physical Exam   Constitutional: He is oriented to person, place, and time  He appears well-developed and well-nourished  No distress  HENT:   Head: Normocephalic and atraumatic  Mouth/Throat: Oropharynx is clear and moist  No oropharyngeal exudate  Eyes: Pupils are equal, round, and reactive to light  Conjunctivae and EOM are normal    Neck: Normal range of motion  Neck supple  No JVD present  Cardiovascular: Regular rhythm  Tachycardia present  Murmur heard  Systolic murmur is present with a grade of 3/6   Pulmonary/Chest: Effort normal and breath sounds normal  He has no wheezes  He has no rales  Abdominal: Soft  Bowel sounds are normal  He exhibits no distension  There is no tenderness  There is no guarding  Musculoskeletal: Normal range of motion  He exhibits no edema  Lymphadenopathy:     He has no cervical adenopathy  Neurological: He is alert and oriented to person, place, and time  Skin: Skin is warm  No rash noted  He is not diaphoretic  Psychiatric: He has a normal mood and affect  Vitals reviewed  ED Medications  Medications - No data to display    Diagnostic Studies  Results Reviewed     Procedure Component Value Units Date/Time    D-dimer, quantitative [[id number]]  (Normal) Collected:  12/29/18 0746    Lab Status:  Final result Specimen:  Blood from Arm, Right Updated:  12/29/18 0811     D-Dimer, Quant 235 ng/ml (FEU)                  No orders to display         Procedures  Procedures      Phone Consults  ED Phone Contact    ED Course  ED Course as of Dec 29 0835   Sat Dec 29, 2018   0267 Chart review:  Visit from yesterday the patient had normal CBC BMP, cardiac labs and EKG/ CXR    0748 EKG:  Sinus tachycardia rate 113  No ST T wave abnormalities  Normal intervals    No change from previous as interpreted by me  9858 D-DIMER QUANTITATIVE: 235                               MDM  CritCare Time    Disposition  Final diagnoses:   Palpitations     Time reflects when diagnosis was documented in both MDM as applicable and the Disposition within this note     Time User Action Codes Description Comment    12/29/2018  8:12 AM Lisset Carolina Add [R00 2] Palpitations       ED Disposition     ED Disposition Condition Comment    Discharge  Tammy Mann discharge to home/self care      Condition at discharge: Good        Follow-up Information     Follow up With Specialties Details Why [facility] Department Emergency Medicine Go to If symptoms worsen [address]  [phone]  ED, [address], [place], [address] [facility], [facility], [place] Family Medicine Schedule an appointment as soon as possible for a visit in 2 days For re-check Saima Dupree [address] [place], [place] [address]  [phone]             Discharge Medication List as of 12/29/2018  8:13 AM      CONTINUE these medications which have NOT CHANGED    Details   albuterol (2 5 mg/3 mL) 0 083 % nebulizer solution Take 1 vial (2 5 mg total) by nebulization every 6 (six) hours as needed for wheezing or shortness of breath, Starting Sat 12/29/2018, Print      albuterol (VENTOLIN HFA) 90 mcg/act inhaler Inhale 2 puffs every 6 (six) hours as needed for wheezing or shortness of breath, Starting Fri 12/28/2018, Until Sat 12/28/2019, Normal      azithromycin (ZITHROMAX) 500 MG tablet Take 1 tablet (500 mg total) by mouth daily for 5 days, Starting Fri 12/28/2018, Until Wed 1/2/2019, Normal      cetirizine (ZyrTEC) 10 mg tablet Take 10 mg by mouth daily, Historical Med      !! predniSONE 20 mg tablet Take 1 tablet (20 mg total) by mouth daily, Starting Fri 12/28/2018, Normal      !! predniSONE 20 mg tablet Take 2 tablets (40 mg total) by mouth daily for 5 days, Starting Sat 12/29/2018, Until Thu 1/3/2019, Print       !! - Potential duplicate medications found  Please discuss with provider  No discharge procedures on file  ED Provider  Attending physically available and evaluated Halie Collins I managed the patient along with the ED Attending      Electronically Signed by         Gualberto Lamas DO  12/29/18 [id number]

## 2019-01-02 ENCOUNTER — OFFICE VISIT (OUTPATIENT)
Dept: FAMILY MEDICINE CLINIC | Facility: CLINIC | Age: 54
End: 2019-01-02
Payer: COMMERCIAL

## 2019-01-02 ENCOUNTER — TELEPHONE (OUTPATIENT)
Dept: FAMILY MEDICINE CLINIC | Facility: CLINIC | Age: 54
End: 2019-01-02

## 2019-01-02 VITALS
SYSTOLIC BLOOD PRESSURE: 110 MMHG | WEIGHT: 153 LBS | DIASTOLIC BLOOD PRESSURE: 78 MMHG | BODY MASS INDEX: 26.12 KG/M2 | TEMPERATURE: 99.3 F | HEART RATE: 88 BPM | HEIGHT: 64 IN

## 2019-01-02 DIAGNOSIS — J45.41 MODERATE PERSISTENT ASTHMA WITH EXACERBATION: ICD-10-CM

## 2019-01-02 DIAGNOSIS — J45.41 MODERATE PERSISTENT ASTHMA WITH ACUTE EXACERBATION: Primary | ICD-10-CM

## 2019-01-02 DIAGNOSIS — J06.9 ACUTE UPPER RESPIRATORY INFECTION: ICD-10-CM

## 2019-01-02 PROCEDURE — 99214 OFFICE O/P EST MOD 30 MIN: CPT | Performed by: PHYSICIAN ASSISTANT

## 2019-01-02 RX ORDER — CEFUROXIME AXETIL 500 MG/1
500 TABLET ORAL EVERY 12 HOURS SCHEDULED
Qty: 20 TABLET | Refills: 0 | Status: SHIPPED | OUTPATIENT
Start: 2019-01-02 | End: 2019-01-12

## 2019-01-02 RX ORDER — ALBUTEROL SULFATE 2.5 MG/3ML
2.5 SOLUTION RESPIRATORY (INHALATION) EVERY 6 HOURS PRN
Qty: 24 VIAL | Refills: 0 | Status: SHIPPED | OUTPATIENT
Start: 2019-01-02

## 2019-01-02 RX ORDER — IPRATROPIUM BROMIDE AND ALBUTEROL SULFATE 2.5; .5 MG/3ML; MG/3ML
3 SOLUTION RESPIRATORY (INHALATION) 4 TIMES DAILY
Qty: 25 VIAL | Refills: 1 | Status: SHIPPED | OUTPATIENT
Start: 2019-01-02 | End: 2019-01-11 | Stop reason: SDUPTHER

## 2019-01-02 RX ORDER — MULTIVITAMIN
1 CAPSULE ORAL DAILY
COMMUNITY

## 2019-01-02 NOTE — PATIENT INSTRUCTIONS
Problem List Items Addressed This Visit        Respiratory    Moderate persistent asthma with acute exacerbation - Primary     Switch to duoneb to use with home neb as directed  Relevant Medications    ipratropium-albuterol (DUO-NEB) 0 5-2 5 mg/3 mL nebulizer solution    Acute upper respiratory infection     Ceftin twice daily  Mucinex D Generic as directed  Increase fluids            Relevant Medications    cefuroxime (CEFTIN) 500 mg tablet

## 2019-01-02 NOTE — TELEPHONE ENCOUNTER
Patient was seen in the hospital & they gave him the albuterol for the nebulizer  They didn't give them much, so they were looking to see if this could be called into the [facility] on [address]  Thank you!

## 2019-01-02 NOTE — PROGRESS NOTES
Assessment/Plan: Moderate persistent asthma with acute exacerbation  Switch to duoneb to use with home neb as directed  Acute upper respiratory infection  Ceftin twice daily  Mucinex D Generic as directed  Increase fluids  Diagnoses and all orders for this visit:    Moderate persistent asthma with acute exacerbation  -     ipratropium-albuterol (DUO-NEB) 0 5-2 5 mg/3 mL nebulizer solution; Take 1 vial (3 mL total) by nebulization 4 (four) times a day    Acute upper respiratory infection  -     cefuroxime (CEFTIN) 500 mg tablet; Take 1 tablet (500 mg total) by mouth every 12 (twelve) hours for 10 days    Other orders  -     Multiple Vitamin (MULTIVITAMIN) capsule; Take 1 capsule by mouth daily          Subjective:   Difficulty breathing  Cannot sleep  He states he completed antibiotic and course of prednisone without relief  He has been to the ER twice since his last visit here 5 days ago  -  [facility]     Patient ID: Khris Akers is a 46 y o  male  Pt here today accompanied by his wife  Was seen on 12/28/18 by DM with asthma exacerbation  He completed 5 day z pack yesterday  Has been to the ER twice since this apt  He finished the once daily 20 mg prednisone and has been using his home neb w/o help  Missing work  Last at work Thursday  He feels more swelling in his throat area that feels tight and then this gets him nervous and he starts pacing  The following portions of the patient's history were reviewed and updated as appropriate: allergies, current medications, past family history, past medical history, past social history, past surgical history and problem list     Review of Systems   Constitutional: Negative  HENT: Positive for congestion, postnasal drip, rhinorrhea, sinus pressure and sore throat  Eyes: Negative  Respiratory: Positive for cough, chest tightness, shortness of breath and wheezing  Cardiovascular: Negative  Gastrointestinal: Negative      Endocrine: Negative  Genitourinary: Negative  Musculoskeletal: Negative  Skin: Negative  Allergic/Immunologic: Negative  Neurological: Negative  Hematological: Negative  Psychiatric/Behavioral: The patient is nervous/anxious  Objective:      /78 (BP Location: Left arm, Patient Position: Sitting, Cuff Size: Large)   Pulse 88   Temp 99 3 °F (37 4 °C) (Oral)   Ht 5' 4" (1 626 m)   Wt 69 4 kg (153 lb)   BMI 26 26 kg/m²          Physical Exam   Constitutional: He is oriented to person, place, and time  He appears well-developed and well-nourished  No distress  HENT:   Head: Normocephalic and atraumatic  Right Ear: Hearing, tympanic membrane, external ear and ear canal normal    Left Ear: Hearing, tympanic membrane, external ear and ear canal normal    Nose: Mucosal edema and rhinorrhea present  Mouth/Throat: Posterior oropharyngeal edema and posterior oropharyngeal erythema present  Eyes: Conjunctivae are normal  Right eye exhibits no discharge  Left eye exhibits no discharge  Neck: Carotid bruit is not present  Cardiovascular: Normal rate, regular rhythm and normal heart sounds  Exam reveals no gallop and no friction rub  No murmur heard  Pulmonary/Chest: Effort normal and breath sounds normal  No respiratory distress  He has no wheezes  He has no rales  Lymphadenopathy:     He has cervical adenopathy  Right cervical: Superficial cervical adenopathy present  Left cervical: Superficial cervical adenopathy present  Neurological: He is alert and oriented to person, place, and time  Skin: Skin is warm and dry  He is not diaphoretic  Psychiatric: He has a normal mood and affect  Judgment normal    Nursing note and vitals reviewed

## 2019-01-03 NOTE — ED PROVIDER NOTES
History  Chief Complaint   Patient presents with    Asthma     Pt states he was at the family doctor earlier today and was given prednisone and inhaler, but it is not helping,  Started with difficulty breathing on lynda odell and has gotten worse since then  44-year-old male presenting to the emergency department for evaluation of asthma exacerbation  Demented asthma for many years  States that over the past several days he has had increased cough shortness of breath chest tightness and wheeze  Used inhaler with minimal improvement  Denies fevers chills  Has some URI symptoms such as sore throat and congestion  No known sick contacts no abdominal pain nausea vomiting  No chest pain lightheadedness syncope or presyncope  Prior to Admission Medications   Prescriptions Last Dose Informant Patient Reported? Taking? albuterol (VENTOLIN HFA) 90 mcg/act inhaler   No Yes   Sig: Inhale 2 puffs every 6 (six) hours as needed for wheezing or shortness of breath   cetirizine (ZyrTEC) 10 mg tablet  Self Yes Yes   Sig: Take 10 mg by mouth daily      Facility-Administered Medications: None       Past Medical History:   Diagnosis Date    Asthma        History reviewed  No pertinent surgical history  Family History   Problem Relation Age of Onset    Diabetes Mother     Hypertension Mother     Thyroid disease Mother         thyroid disorder    Prostate cancer Father      I have reviewed and agree with the history as documented  Social History   Substance Use Topics    Smoking status: Never Smoker    Smokeless tobacco: Never Used    Alcohol use No      Comment: social drinker per [employer]         Review of Systems   Constitutional: Negative for appetite change, chills, fatigue and fever  HENT: Negative for sneezing and sore throat  Eyes: Negative for visual disturbance  Respiratory: Positive for chest tightness, shortness of breath and wheezing  Negative for cough and choking  Cardiovascular: Negative for chest pain and palpitations  Gastrointestinal: Negative for abdominal pain, constipation, diarrhea, nausea and vomiting  Genitourinary: Negative for difficulty urinating and dysuria  Neurological: Negative for dizziness, weakness, light-headedness, numbness and headaches  All other systems reviewed and are negative  Physical Exam  ED Triage Vitals   Temperature Pulse Respirations Blood Pressure SpO2   12/28/18 2125 12/28/18 2125 12/28/18 2125 12/28/18 2125 12/28/18 2125   98 °F (36 7 °C) (!) 109 20 157/100 99 %      Temp Source Heart Rate Source Patient Position - Orthostatic VS BP Location FiO2 (%)   12/28/18 2125 12/28/18 2142 12/28/18 2142 12/28/18 2142 --   Oral Monitor Sitting Left arm       Pain Score       12/28/18 2125       No Pain           Orthostatic Vital Signs  Vitals:    12/28/18 2230 12/28/18 2300 12/29/18 0015 12/29/18 0100   BP: (!) 191/104 158/84 146/83 129/75   Pulse: 94 95 (!) 122 (!) 120   Patient Position - Orthostatic VS: Lying Lying Lying Lying       Physical Exam   Constitutional: He is oriented to person, place, and time  He appears well-developed and well-nourished  No distress  HENT:   Head: Normocephalic and atraumatic  Mouth/Throat: Oropharynx is clear and moist    Eyes: Pupils are equal, round, and reactive to light  EOM are normal    Neck: No JVD present  No tracheal deviation present  Cardiovascular: Normal rate, regular rhythm, normal heart sounds and intact distal pulses  Exam reveals no gallop and no friction rub  No murmur heard  Pulmonary/Chest: Effort normal  No respiratory distress  He has wheezes  He has no rales  Abdominal: Soft  Bowel sounds are normal  He exhibits no distension  There is no tenderness  There is no rebound and no guarding  Neurological: He is alert and oriented to person, place, and time  No cranial nerve deficit  He exhibits normal muscle tone  Skin: Skin is warm and dry  He is not diaphoretic  No pallor  Psychiatric: He has a normal mood and affect  His behavior is normal    Nursing note and vitals reviewed  ED Medications  Medications   albuterol inhalation solution 5 mg (5 mg Nebulization Given 12/28/18 2218)   ipratropium (ATROVENT) 0 02 % inhalation solution 0 5 mg (0 5 mg Nebulization Given 12/28/18 2218)   methylPREDNISolone sodium succinate (Solu-MEDROL) injection 125 mg (125 mg Intravenous Given 12/28/18 2218)   sodium chloride 0 9 % bolus 1,000 mL (0 mL Intravenous Stopped 12/29/18 0010)   albuterol inhalation solution 5 mg (5 mg Nebulization Given 12/29/18 0058)   ipratropium (ATROVENT) 0 02 % inhalation solution 0 5 mg (0 5 mg Nebulization Given 12/29/18 0058)       Diagnostic Studies  Results Reviewed     Procedure Component Value Units Date/Time    CBC and differential [[id number]]  (Abnormal) Collected:  12/28/18 2218    Lab Status:  Final result Specimen:  Blood from Arm, Left Updated:  12/28/18 2258     WBC 15 71 (H) Thousand/uL      RBC 5 06 Million/uL      Hemoglobin 14 2 g/dL      Hematocrit 43 5 %      MCV 86 fL      MCH 28 1 pg      MCHC 32 6 g/dL      RDW 11 2 (L) %      MPV 10 7 fL      Platelets 565 Thousands/uL      nRBC 0 /100 WBCs      Neutrophils Relative 94 (H) %      Immat GRANS % 0 %      Lymphocytes Relative 3 (L) %      Monocytes Relative 3 (L) %      Eosinophils Relative 0 %      Basophils Relative 0 %      Neutrophils Absolute 14 65 (H) Thousands/µL      Immature Grans Absolute 0 07 Thousand/uL      Lymphocytes Absolute 0 51 (L) Thousands/µL      Monocytes Absolute 0 42 Thousand/µL      Eosinophils Absolute 0 01 Thousand/µL      Basophils Absolute 0 05 Thousands/µL     Narrative: This is an appended report  These results have been appended to a previously verified report      Troponin I [[id number]]  (Normal) Collected:  12/28/18 2218    Lab Status:  Final result Specimen:  Blood from Arm, Left Updated:  12/28/18 2248     Troponin I <0 02 ng/mL Comprehensive metabolic panel [[id number]] Collected:  12/28/18 2218    Lab Status:  Final result Specimen:  Blood from Arm, Left Updated:  12/28/18 2247     Sodium 140 mmol/L      Potassium 4 3 mmol/L      Chloride 107 mmol/L      CO2 26 mmol/L      ANION GAP 7 mmol/L      BUN 19 mg/dL      Creatinine 1 23 mg/dL      Glucose 133 mg/dL      Calcium 9 5 mg/dL      AST 17 U/L      ALT 25 U/L      Alkaline Phosphatase 79 U/L      Total Protein 7 8 g/dL      Albumin 4 1 g/dL      Total Bilirubin 0 48 mg/dL      eGFR 66 ml/min/1 73sq m     Narrative:         National Kidney Disease Education Program recommendations are as follows:  GFR calculation is accurate only with a steady state creatinine  Chronic Kidney disease less than 60 ml/min/1 73 sq  meters  Kidney failure less than 15 ml/min/1 73 sq  meters  XR chest 2 views   ED Interpretation by Araceli Rondon MD (12/28 2327)   No active pulmonary disease      Final Result by Pepe Ambrocio MD (12/29 8713)      No acute cardiopulmonary disease  Workstation performed: [id number]               Procedures  Procedures      Phone Consults  ED Phone Contact    ED Course                               MDM  Number of Diagnoses or Management Options  Asthma exacerbation:   Diagnosis management comments: 57-year-old male with asthma exacerbation  Will give nebs, steroids check chest x-ray  On re-evaluation patient is feeling better  Lungs are clear  Patient feels palpitations the discussed this patient's that tachycardia is a likely side effect of the medicine and should wear off  Patient understands he feels well and would like to be discharged at this time      CritCare Time    Disposition  Final diagnoses:   Asthma exacerbation     Time reflects when diagnosis was documented in both MDM as applicable and the Disposition within this note     Time User Action Codes Description Comment    12/29/2018  1:35 AM Ham Valente Add [[id number]] Asthma exacerbation ED Disposition     ED Disposition Condition Comment    Discharge  Keenan Beebe discharge to home/self care  Condition at discharge: Stable        Follow-up Information     Follow up With Specialties Details Why 725 Camila Peoples MD [facility]   [address] [place] [id number]  [phone]            Discharge Medication List as of 12/29/2018  1:41 AM      START taking these medications    Details   ! ! predniSONE 20 mg tablet Take 2 tablets (40 mg total) by mouth daily for 5 days, Starting Sat 12/29/2018, Until Thu 1/3/2019, Print       !! - Potential duplicate medications found  Please discuss with provider  CONTINUE these medications which have NOT CHANGED    Details   albuterol (VENTOLIN HFA) 90 mcg/act inhaler Inhale 2 puffs every 6 (six) hours as needed for wheezing or shortness of breath, Starting Fri 12/28/2018, Until Sat 12/28/2019, Normal      cetirizine (ZyrTEC) 10 mg tablet Take 10 mg by mouth daily, Historical Med      azithromycin (ZITHROMAX) 500 MG tablet Take 1 tablet (500 mg total) by mouth daily for 5 days, Starting Fri 12/28/2018, Until Wed 1/2/2019, Normal      !! predniSONE 20 mg tablet Take 1 tablet (20 mg total) by mouth daily, Starting Fri 12/28/2018, Normal       !! - Potential duplicate medications found  Please discuss with provider  No discharge procedures on file  ED Provider  Attending physically available and evaluated Keenan Beebe I managed the patient along with the ED Attending      Electronically Signed by         Debbie Barajas MD  01/03/19 0695

## 2019-01-11 ENCOUNTER — OFFICE VISIT (OUTPATIENT)
Dept: FAMILY MEDICINE CLINIC | Facility: CLINIC | Age: 54
End: 2019-01-11
Payer: COMMERCIAL

## 2019-01-11 VITALS
HEART RATE: 72 BPM | DIASTOLIC BLOOD PRESSURE: 74 MMHG | HEIGHT: 64 IN | WEIGHT: 154 LBS | BODY MASS INDEX: 26.29 KG/M2 | RESPIRATION RATE: 16 BRPM | SYSTOLIC BLOOD PRESSURE: 112 MMHG

## 2019-01-11 DIAGNOSIS — J45.41 MODERATE PERSISTENT ASTHMA WITH ACUTE EXACERBATION: Primary | ICD-10-CM

## 2019-01-11 PROCEDURE — 99213 OFFICE O/P EST LOW 20 MIN: CPT | Performed by: FAMILY MEDICINE

## 2019-01-11 RX ORDER — IPRATROPIUM BROMIDE AND ALBUTEROL SULFATE 2.5; .5 MG/3ML; MG/3ML
3 SOLUTION RESPIRATORY (INHALATION) 4 TIMES DAILY
Qty: 120 VIAL | Refills: 5 | Status: SHIPPED | OUTPATIENT
Start: 2019-01-11 | End: 2019-02-06 | Stop reason: SDUPTHER

## 2019-01-11 NOTE — PROGRESS NOTES
Assessment/Plan: Moderate persistent asthma with acute exacerbation  At this point, the patient is not on any controller medications  I would recommend that we add Advair 500/50, 1 inhalation twice a day, continue on DuoNeb at 4 times a day  If he does not have the DuoNeb available in he is having an increasing problem with shortness of breath, okay to use albuterol  He should certainly carry the albuterol inhaler with him  Diagnoses and all orders for this visit:    Moderate persistent asthma with acute exacerbation  -     fluticasone-salmeterol (ADVAIR) 500-50 mcg/dose inhaler; Inhale 1 puff 2 (two) times a day for 180 days Rinse mouth after use  -     ipratropium-albuterol (DUO-NEB) 0 5-2 5 mg/3 mL nebulizer solution; Take 1 vial (3 mL total) by nebulization 4 (four) times a day for 180 days          Subjective: Pt here for a follow up, and having trouble sleeping  GEO Velasco     Patient ID: Jaciel Wade is a 46 y o  male  Patient noted increased problems breathing on [place] odell, and then went to ER  He had increased palpitations with that  He did then have Duoneb treatment  Continues with increased SOB and wheezes  Currently, the patient is using DuoNeb for nebulizer, Mucinex over-the-counter, and he is on antibiotics  He will be done with that in the near future  He does not currently have any inhaled corticosteroids nor long-acting beta agonist   He did complete a 5 day burst of prednisone  Work has caused some issues, ie symptoms when he went into a home  The following portions of the patient's history were reviewed and updated as appropriate: allergies, current medications and problem list     Review of Systems   Constitutional: Negative  HENT: Negative  Respiratory: Positive for cough, shortness of breath and wheezing            Objective:      Vitals:    01/11/19 1054   BP: 112/74   BP Location: Left arm   Patient Position: Sitting   Cuff Size: Standard   Pulse: 72 Resp: 16   Weight: 69 9 kg (154 lb)   Height: 5' 4" (1 626 m)          Physical Exam   Constitutional: He appears well-developed and well-nourished  HENT:   Head: Normocephalic and atraumatic  Neck: Normal range of motion  Neck supple  Cardiovascular: Normal rate, regular rhythm and normal heart sounds  Exam reveals no gallop and no friction rub  No murmur heard  Pulses:       Carotid pulses are 2+ on the right side, and 2+ on the left side  Pulmonary/Chest: Effort normal and breath sounds normal  No respiratory distress  He has no wheezes  He has no rales  Nursing note and vitals reviewed

## 2019-01-11 NOTE — PATIENT INSTRUCTIONS
Problem List Items Addressed This Visit        Respiratory    Moderate persistent asthma with acute exacerbation - Primary     At this point, the patient is not on any controller medications  I would recommend that we add Advair 500/50, 1 inhalation twice a day, continue on DuoNeb at 4 times a day  If he does not have the DuoNeb available in he is having an increasing problem with shortness of breath, okay to use albuterol  He should certainly carry the albuterol inhaler with him           Relevant Medications    fluticasone-salmeterol (ADVAIR) 500-50 mcg/dose inhaler    ipratropium-albuterol (DUO-NEB) 0 5-2 5 mg/3 mL nebulizer solution

## 2019-01-11 NOTE — ASSESSMENT & PLAN NOTE
At this point, the patient is not on any controller medications  I would recommend that we add Advair 500/50, 1 inhalation twice a day, continue on DuoNeb at 4 times a day  If he does not have the DuoNeb available in he is having an increasing problem with shortness of breath, okay to use albuterol  He should certainly carry the albuterol inhaler with him

## 2019-01-14 ENCOUNTER — TELEPHONE (OUTPATIENT)
Dept: FAMILY MEDICINE CLINIC | Facility: CLINIC | Age: 54
End: 2019-01-14

## 2019-01-14 NOTE — TELEPHONE ENCOUNTER
Patient was prescribed Advair and she said that with their insurance this medication is very expensive  She called insurance and said the the level albuterol (the generic of zopenex) is what is covered more by there insurance  They were wondering if this could be called into the [facility] [address] [facility] instead  Thank you!

## 2019-01-16 NOTE — TELEPHONE ENCOUNTER
These 2 medications are the same class, but based on looking in epic, they are the same tier for cost purposes  Please have the patient call his insurance, and ask what inhaler they would approve  Again, this should be inhaled corticosteroid, and long acting beta agonist combination

## 2019-01-16 NOTE — TELEPHONE ENCOUNTER
TH, I spoke to the pharmacist @ [facility] and she recommended either Breo or Symbicort

## 2019-01-16 NOTE — TELEPHONE ENCOUNTER
This is not the same class of medications  Please call pharmacy and ask them for alternative of Inhaled Corticosteroid and Long acting Beta Agonist  The Xopenex (Levalbuterol) is just a short acting medication, and would replace Albuterol

## 2019-01-18 NOTE — TELEPHONE ENCOUNTER
TH, I spoke to the pharmasist at [address] [place] [address] they stated that the pt has a deductible that needs to be met that is why the price is so high for his Advair, Pt can also try Breo or Symbicort but they are both around 300 00$

## 2019-01-18 NOTE — TELEPHONE ENCOUNTER
Please let patient know it is his deductable based on staff conversation with PBM  Based on this, I do not have any particular preference as to which 1 he would use

## 2019-01-19 NOTE — TELEPHONE ENCOUNTER
Spoke to pt's spouse, she will call pharmacy and again will get prices but aware that they have deductible

## 2019-01-25 ENCOUNTER — OFFICE VISIT (OUTPATIENT)
Dept: FAMILY MEDICINE CLINIC | Facility: CLINIC | Age: 54
End: 2019-01-25
Payer: COMMERCIAL

## 2019-01-25 VITALS
DIASTOLIC BLOOD PRESSURE: 80 MMHG | BODY MASS INDEX: 27.14 KG/M2 | SYSTOLIC BLOOD PRESSURE: 130 MMHG | HEIGHT: 64 IN | WEIGHT: 159 LBS | HEART RATE: 72 BPM

## 2019-01-25 DIAGNOSIS — J45.41 MODERATE PERSISTENT ASTHMA WITH ACUTE EXACERBATION: Primary | ICD-10-CM

## 2019-01-25 PROCEDURE — 99213 OFFICE O/P EST LOW 20 MIN: CPT | Performed by: FAMILY MEDICINE

## 2019-01-25 RX ORDER — BUDESONIDE 1 MG/2ML
1 INHALANT ORAL 2 TIMES DAILY
Qty: 60 ML | Refills: 0 | Status: SHIPPED | OUTPATIENT
Start: 2019-01-25 | End: 2019-02-08 | Stop reason: SDUPTHER

## 2019-01-25 RX ORDER — PREDNISONE 10 MG/1
TABLET ORAL
Qty: 50 TABLET | Refills: 0 | Status: SHIPPED | OUTPATIENT
Start: 2019-01-25 | End: 2019-02-08 | Stop reason: ALTCHOICE

## 2019-01-25 NOTE — ASSESSMENT & PLAN NOTE
Patient is having worsening problem with his asthma secondary to a recent exposure to and ill family member  At this point, would recommend that he go on to inhaled corticosteroid with long-acting beta agonist, but there is a significant issue with regard to cost for this medication  As an alternative, he can use budesonide (Pulmicort) 1 nebulizer twice a day with DuoNeb  He should do both in the morning, as well as the evening  In the other times where he is having an issue with breathing, the DuoNeb should continue  If the Pulmicort also becomes an issue cost wise, I would send a prescription for prednisone to the pharmacy  I prefer that he hold off on prednisone if he could

## 2019-01-25 NOTE — PROGRESS NOTES
Assessment/Plan: Moderate persistent asthma with acute exacerbation  Patient is having worsening problem with his asthma secondary to a recent exposure to and ill family member  At this point, would recommend that he go on to inhaled corticosteroid with long-acting beta agonist, but there is a significant issue with regard to cost for this medication  As an alternative, he can use budesonide (Pulmicort) 1 nebulizer twice a day with DuoNeb  He should do both in the morning, as well as the evening  In the other times where he is having an issue with breathing, the DuoNeb should continue  If the Pulmicort also becomes an issue cost wise, I would send a prescription for prednisone to the pharmacy  I prefer that he hold off on prednisone if he could  Diagnoses and all orders for this visit:    Moderate persistent asthma with acute exacerbation  -     budesonide (PULMICORT) 1 MG/2ML nebulizer solution; Take 1 mL (1 mg total) by nebulization 2 (two) times a day for 30 days Use with Duoneb  -     predniSONE 10 mg tablet; Take 3 BID for 3 days then 2 BID for 3 days then 1 BID for 3 days then 1 QD for 3 days then 1/2 QD for 3 days then stop          Subjective:   CC: 2 week follow up to asthma and trouble sleeping  [facility]     Patient ID: Denzel Alba is a 46 y o  male  Patient was doing relatively well, but then was exposed to an ill relative  After that, his asthma seemed to be worse again  He did mention that when he is exposed to his dogs, something he has had for quite some time now, he seems to be worse when he has an asthma exacerbation  In his up stairs room he has an air purifier, and is able to breathe better at that point  Advair: He was not able to get this due to insurance  He is using DuoNeb          The following portions of the patient's history were reviewed and updated as appropriate: allergies, current medications and problem list     Review of Systems   Constitutional:        Per HPI    HENT: Positive for congestion, postnasal drip and sore throat  Negative for sinus pain and sinus pressure  Respiratory: Positive for cough, chest tightness, shortness of breath and wheezing  Cardiovascular: Negative  Gastrointestinal: Negative  All other systems reviewed and are negative  Objective:      Vitals:    01/25/19 0921   BP: 130/80   BP Location: Left arm   Patient Position: Sitting   Pulse: 72   Weight: 72 1 kg (159 lb)   Height: 5' 4" (1 626 m)            Physical Exam   Constitutional: He appears well-developed and well-nourished  HENT:   Head: Normocephalic and atraumatic  Neck: Normal range of motion  Neck supple  Cardiovascular: Normal rate, regular rhythm and normal heart sounds  Exam reveals no gallop and no friction rub  No murmur heard  Pulses:       Carotid pulses are 2+ on the right side, and 2+ on the left side  Pulmonary/Chest: Effort normal and breath sounds normal  No respiratory distress  He has no wheezes  He has no rales  Nursing note and vitals reviewed

## 2019-01-25 NOTE — PATIENT INSTRUCTIONS
Problem List Items Addressed This Visit     Moderate persistent asthma with acute exacerbation - Primary     Patient is having worsening problem with his asthma secondary to a recent exposure to and ill family member  At this point, would recommend that he go on to inhaled corticosteroid with long-acting beta agonist, but there is a significant issue with regard to cost for this medication  As an alternative, he can use budesonide (Pulmicort) 1 nebulizer twice a day with DuoNeb  He should do both in the morning, as well as the evening  In the other times where he is having an issue with breathing, the DuoNeb should continue  If the Pulmicort also becomes an issue cost wise, I would send a prescription for prednisone to the pharmacy  I prefer that he hold off on prednisone if he could           Relevant Medications    budesonide (PULMICORT) 1 MG/2ML nebulizer solution    predniSONE 10 mg tablet

## 2019-02-06 DIAGNOSIS — J45.41 MODERATE PERSISTENT ASTHMA WITH ACUTE EXACERBATION: ICD-10-CM

## 2019-02-06 RX ORDER — IPRATROPIUM BROMIDE AND ALBUTEROL SULFATE 2.5; .5 MG/3ML; MG/3ML
3 SOLUTION RESPIRATORY (INHALATION) 4 TIMES DAILY
Qty: 360 VIAL | Refills: 3 | Status: SHIPPED | OUTPATIENT
Start: 2019-02-06 | End: 2019-08-05

## 2019-02-08 ENCOUNTER — OFFICE VISIT (OUTPATIENT)
Dept: FAMILY MEDICINE CLINIC | Facility: CLINIC | Age: 54
End: 2019-02-08
Payer: COMMERCIAL

## 2019-02-08 ENCOUNTER — TELEPHONE (OUTPATIENT)
Dept: FAMILY MEDICINE CLINIC | Facility: CLINIC | Age: 54
End: 2019-02-08

## 2019-02-08 VITALS
SYSTOLIC BLOOD PRESSURE: 128 MMHG | BODY MASS INDEX: 27.31 KG/M2 | DIASTOLIC BLOOD PRESSURE: 76 MMHG | HEART RATE: 72 BPM | RESPIRATION RATE: 20 BRPM | HEIGHT: 64 IN | WEIGHT: 160 LBS

## 2019-02-08 DIAGNOSIS — J45.41 MODERATE PERSISTENT ASTHMA WITH ACUTE EXACERBATION: Primary | ICD-10-CM

## 2019-02-08 PROCEDURE — 1036F TOBACCO NON-USER: CPT | Performed by: FAMILY MEDICINE

## 2019-02-08 PROCEDURE — 99213 OFFICE O/P EST LOW 20 MIN: CPT | Performed by: FAMILY MEDICINE

## 2019-02-08 RX ORDER — PREDNISONE 10 MG/1
TABLET ORAL
Qty: 50 TABLET | Refills: 0 | Status: SHIPPED | OUTPATIENT
Start: 2019-02-08 | End: 2019-03-13 | Stop reason: ALTCHOICE

## 2019-02-08 RX ORDER — BUDESONIDE 1 MG/2ML
1 INHALANT ORAL 2 TIMES DAILY
Qty: 120 ML | Refills: 0 | Status: SHIPPED | OUTPATIENT
Start: 2019-02-08 | End: 2019-03-02 | Stop reason: ALTCHOICE

## 2019-02-08 NOTE — ASSESSMENT & PLAN NOTE
At this point, the patient's asthma is quite severe  When he came off the prednisone, the symptoms came back, but he was not able to get on to the inhaled corticosteroids yet  This was all due to cost with insurance  Recommend that they review with the insurance company again, and I will send Advair and Pulmicort to the local pharmacy  Advair should be used twice a day, Pulmicort should be used twice a day as a nebulized solution with DuoNeb  The goal here is to decrease inflammation in the lungs, ultimately decreasing irritation and airway remodeling  This is going to be with decreasing the exposure to systemic steroids, something he has been on multiple times already

## 2019-02-08 NOTE — TELEPHONE ENCOUNTER
PATIENT STATES THAT THERE WAS A DISCUSSION ABOUT GOING BACK ON PREDNISONE IF THEY CANNOT FIND A PLACE TO GET HIS MEDICINE CHEAPER  THEY STILL HAVEN'T HAD ANY LUCK, SO HE IS LOOKING FOR THE PREDNISONE TO BE SENT TO THE [facility] ON [address]  THANK YOU!

## 2019-02-08 NOTE — PATIENT INSTRUCTIONS
Problem List Items Addressed This Visit     Moderate persistent asthma with acute exacerbation - Primary     At this point, the patient's asthma is quite severe  When he came off the prednisone, the symptoms came back, but he was not able to get on to the inhaled corticosteroids yet  This was all due to cost with insurance  Recommend that they review with the insurance company again, and I will send Advair and Pulmicort to the local pharmacy  Advair should be used twice a day, Pulmicort should be used twice a day as a nebulized solution with DuoNeb  The goal here is to decrease inflammation in the lungs, ultimately decreasing irritation and airway remodeling  This is going to be with decreasing the exposure to systemic steroids, something he has been on multiple times already           Relevant Medications    budesonide (PULMICORT) 1 MG/2ML nebulizer solution    fluticasone-salmeterol (ADVAIR) 500-50 mcg/dose inhaler

## 2019-02-08 NOTE — TELEPHONE ENCOUNTER
Unfortunately, the patient was not able to obtain Advair or Pulmicort  Based on this, will start prednisone again  I did discuss with him during the office visit that this is not recommended to continue

## 2019-02-08 NOTE — PROGRESS NOTES
Assessment/Plan: Moderate persistent asthma with acute exacerbation  At this point, the patient's asthma is quite severe  When he came off the prednisone, the symptoms came back, but he was not able to get on to the inhaled corticosteroids yet  This was all due to cost with insurance  Recommend that they review with the insurance company again, and I will send Advair and Pulmicort to the local pharmacy  Advair should be used twice a day, Pulmicort should be used twice a day as a nebulized solution with DuoNeb  The goal here is to decrease inflammation in the lungs, ultimately decreasing irritation and airway remodeling  This is going to be with decreasing the exposure to systemic steroids, something he has been on multiple times already  Diagnoses and all orders for this visit:    Moderate persistent asthma with acute exacerbation  -     budesonide (PULMICORT) 1 MG/2ML nebulizer solution; Take 1 mL (1 mg total) by nebulization 2 (two) times a day for 30 days Use with Duoneb  -     fluticasone-salmeterol (ADVAIR) 500-50 mcg/dose inhaler; Inhale 1 puff 2 (two) times a day for 180 days Rinse mouth after use  Subjective: Pt here for a follow up, pt states he feels better while being on prednisone  GEO Velasco     Patient ID: Aaron Rocha is a 46 y o  male  He does not have Advair  Insurance issues with this  Pulmicort has been increased cost as well  Po prednisone has helped, but when he finished, the symptoms returned  Unfortunately, when he was on Advair previously he did quite well  For quite some time he was not having a problem  There are certainly some environmental triggers that have caused him to have major issues  At this point, he has tried to get the Pulmicort, as well as Advair  It seems that the insurance companies have not asked us for help with prior authorization, nor his pharmacy    He has been on prednisone multiple times in the past, and unfortunately this may cause some problems for him  The following portions of the patient's history were reviewed and updated as appropriate: allergies, current medications and problem list     Review of Systems   Constitutional: Negative  HENT: Negative  Respiratory: Negative for cough, shortness of breath and wheezing  Throat is somewhat tighter, he can feel it starting to close a bit   Gastrointestinal: Negative  Objective:      Vitals:    02/08/19 0821   BP: 128/76   BP Location: Left arm   Patient Position: Sitting   Cuff Size: Large   Pulse: 72   Resp: 20   Weight: 72 6 kg (160 lb)   Height: 5' 4" (1 626 m)          Physical Exam   Constitutional: He appears well-developed and well-nourished  HENT:   Head: Normocephalic and atraumatic  Neck: Normal range of motion  Neck supple  Cardiovascular: Normal rate, regular rhythm and normal heart sounds  Exam reveals no gallop and no friction rub  No murmur heard  Pulses:       Carotid pulses are 2+ on the right side, and 2+ on the left side  Pulmonary/Chest: Effort normal and breath sounds normal  No respiratory distress  He has no wheezes  He has no rales  Nursing note and vitals reviewed

## 2019-02-28 ENCOUNTER — TELEPHONE (OUTPATIENT)
Dept: FAMILY MEDICINE CLINIC | Facility: CLINIC | Age: 54
End: 2019-02-28

## 2019-02-28 DIAGNOSIS — J45.41 MODERATE PERSISTENT ASTHMA WITH ACUTE EXACERBATION: Primary | ICD-10-CM

## 2019-02-28 NOTE — TELEPHONE ENCOUNTER
PT SEES DR Kyra Tang  HE CANNOT AFFORD THE ADVAIR AND PULMOCORT  EVEN WITH THE COUPON CARD THE PULMOCORT FOR 15 VIALS WAS $221, WHICH HE SHOULD BE TAKEN TWICE A DAY WHICH WOULDN'T EVEN COVER THE WHOLE MONTH  AS FAR AS THE ADVAIR GOES WITH THE COUPON CARD IT WOULD BE $500  IS THERE ANYTHING ELSE HE CAN POSSIBLY TAKE? ALSO PT WAS ON 2 DOSES OF PREDNISONE SO HE CANNOT TAKE ANYMORE DOSES  PLEASE ADVISE  THANK YOU  SHE CALLED HER INSURANCE, THE , THEY DO NOT QUALIFY FOR ANYTHING  THANK YOU

## 2019-03-01 NOTE — TELEPHONE ENCOUNTER
TH, I spoke to pharmacy and they state that if you change pts Pulmicort to  5 mg/2 ml would cost the pt 180 55$ and the Breo would cost pt 195 14$, Would you want to change?

## 2019-03-01 NOTE — TELEPHONE ENCOUNTER
Patients wife called back again  She wants to know what can be done  Please call her at [address]

## 2019-03-02 NOTE — TELEPHONE ENCOUNTER
Pt Wife called because her husbands medication has not yet carlos alberto received by [facility] pharmacy   Health call called  office back line and they were already closed  She will be calling at 2 to check on the status of that medication

## 2019-03-02 NOTE — TELEPHONE ENCOUNTER
Wife called again and between the two of us we found a 10 dollar savings card and the info was given by Fax to [facility] and I called in the Highest dose Breo 200-25 per TS

## 2019-03-06 RX ORDER — FLUTICASONE FUROATE AND VILANTEROL 200; 25 UG/1; UG/1
1 POWDER RESPIRATORY (INHALATION) DAILY
Qty: 1 INHALER | Refills: 11 | OUTPATIENT
Start: 2019-03-06

## 2019-03-12 NOTE — TELEPHONE ENCOUNTER
Patient's wife called back again and I told her that you wanted him on breo and not both  She said that he has not been bad but they feel like he needs something else  I told her at this point we need him to come in for a med check to see what else we can do for him   Just an ugu

## 2019-03-12 NOTE — TELEPHONE ENCOUNTER
Patient's wife called back and apparently there was some miscommunication  Patient should be on pulmicort and breo   They did  the breo but they still need the pulmicort to be sent in to the pharmacy

## 2019-03-13 ENCOUNTER — OFFICE VISIT (OUTPATIENT)
Dept: FAMILY MEDICINE CLINIC | Facility: CLINIC | Age: 54
End: 2019-03-13
Payer: COMMERCIAL

## 2019-03-13 VITALS
WEIGHT: 163.2 LBS | TEMPERATURE: 98.8 F | BODY MASS INDEX: 27.86 KG/M2 | HEART RATE: 96 BPM | SYSTOLIC BLOOD PRESSURE: 136 MMHG | RESPIRATION RATE: 32 BRPM | DIASTOLIC BLOOD PRESSURE: 78 MMHG | HEIGHT: 64 IN

## 2019-03-13 DIAGNOSIS — J45.41 MODERATE PERSISTENT ASTHMA WITH ACUTE EXACERBATION: Primary | ICD-10-CM

## 2019-03-13 DIAGNOSIS — J30.89 NON-SEASONAL ALLERGIC RHINITIS, UNSPECIFIED TRIGGER: ICD-10-CM

## 2019-03-13 DIAGNOSIS — Z12.11 SCREENING FOR COLON CANCER: ICD-10-CM

## 2019-03-13 PROCEDURE — 99213 OFFICE O/P EST LOW 20 MIN: CPT | Performed by: FAMILY MEDICINE

## 2019-03-13 PROCEDURE — 3008F BODY MASS INDEX DOCD: CPT | Performed by: FAMILY MEDICINE

## 2019-03-13 RX ORDER — PREDNISONE 1 MG/1
5 TABLET ORAL DAILY
Qty: 10 TABLET | Refills: 0 | Status: SHIPPED | OUTPATIENT
Start: 2019-03-13 | End: 2019-03-19 | Stop reason: SDUPTHER

## 2019-03-13 NOTE — ASSESSMENT & PLAN NOTE
Lungs are clear on exam   No changes at the moment  Follow-up in the future  Continue on Breo, DuoNeb p r n , albuterol p r n  Thai Cassette

## 2019-03-13 NOTE — PATIENT INSTRUCTIONS
Problem List Items Addressed This Visit     Allergic rhinitis     Patient has increased amount of mucus production  There is also significant amounts in the upper airways  Recommend continue on Zyrtec  Patient did improve quite significantly with prednisone  I did review the long-term side effects, as well as the shorter term benefits  Patient mentioned that when he was on 5 mg of prednisone even noticed a difference in the past   At this time, I will give him a short course of 5 mg of prednisone daily (10 tabs)  I will also recommend that he follow with Allergy and immunology  Given that he cannot take Singulair, there is very limited options for what I can do  Again, his lungs sound quite good at the moment, it all seems to be upper airways  Relevant Medications    predniSONE 5 mg tablet    Other Relevant Orders    Ambulatory referral to Allergy    Moderate persistent asthma with acute exacerbation - Primary     Lungs are clear on exam   No changes at the moment  Follow-up in the future  Continue on Breo, DuoNeb p r n , albuterol p r n             Other Visit Diagnoses     Screening for colon cancer        Relevant Orders    Ambulatory referral to Gastroenterology

## 2019-03-13 NOTE — ASSESSMENT & PLAN NOTE
Patient has increased amount of mucus production  There is also significant amounts in the upper airways  Recommend continue on Zyrtec  Patient did improve quite significantly with prednisone  I did review the long-term side effects, as well as the shorter term benefits  Patient mentioned that when he was on 5 mg of prednisone even noticed a difference in the past   At this time, I will give him a short course of 5 mg of prednisone daily (10 tabs)  I will also recommend that he follow with Allergy and immunology  Given that he cannot take Singulair, there is very limited options for what I can do  Again, his lungs sound quite good at the moment, it all seems to be upper airways

## 2019-03-13 NOTE — PROGRESS NOTES
Assessment/Plan: Moderate persistent asthma with acute exacerbation  Lungs are clear on exam   No changes at the moment  Follow-up in the future  Continue on Breo, DuoNeb p r n , albuterol p r n       Allergic rhinitis  Patient has increased amount of mucus production  There is also significant amounts in the upper airways  Recommend continue on Zyrtec  Patient did improve quite significantly with prednisone  I did review the long-term side effects, as well as the shorter term benefits  Patient mentioned that when he was on 5 mg of prednisone even noticed a difference in the past   At this time, I will give him a short course of 5 mg of prednisone daily (10 tabs)  I will also recommend that he follow with Allergy and immunology  Given that he cannot take Singulair, there is very limited options for what I can do  Again, his lungs sound quite good at the moment, it all seems to be upper airways  Diagnoses and all orders for this visit:    Moderate persistent asthma with acute exacerbation    Non-seasonal allergic rhinitis, unspecified trigger  -     Ambulatory referral to Allergy; Future  -     predniSONE 5 mg tablet; Take 1 tablet (5 mg total) by mouth daily for 10 days    Screening for colon cancer  -     Ambulatory referral to Gastroenterology; Future          Subjective:   Chief Complaint   Patient presents with    Follow-up    Asthma     coughin and sob for 3-4 days nw        Patient ID: Carlos Mathis is a 46 y o  male  Patient is noting that the [facility] had helped before, but it is not helping as well as the prednisone seemed to do for him  He is having an increase in mucus  Most of this is in the throat  With regard to his breathing, the lungs seemed to be doing well, the problem is that his throat and upper airways are irritated  With regard to the asthma, he seems to be doing relatively well with this, again the lung seem to be doing well    Denies any other particular problems with it         The following portions of the patient's history were reviewed and updated as appropriate: allergies, current medications and problem list     Review of Systems   Constitutional: Negative  HENT: Positive for postnasal drip and rhinorrhea  Respiratory: Positive for shortness of breath  Negative for wheezing  Objective:      /78 (BP Location: Left arm, Patient Position: Sitting, Cuff Size: Standard)   Pulse 96   Temp 98 8 °F (37 1 °C)   Resp (!) 32   Ht 5' 4" (1 626 m)   Wt 74 kg (163 lb 3 2 oz)   BMI 28 01 kg/m²          Physical Exam   Constitutional: He appears well-developed and well-nourished  HENT:   Head: Normocephalic and atraumatic  Mouth/Throat: Uvula is midline and mucous membranes are normal  Posterior oropharyngeal edema and posterior oropharyngeal erythema present  Tonsils are 0 on the right  Tonsils are 0 on the left  Significant amount of postnasal drip noted in the posterior oropharynx   Neck: Normal range of motion  Neck supple  Cardiovascular: Normal rate, regular rhythm and normal heart sounds  Exam reveals no gallop and no friction rub  No murmur heard  Pulses:       Carotid pulses are 2+ on the right side, and 2+ on the left side  Pulmonary/Chest: Effort normal and breath sounds normal  No respiratory distress  He has no wheezes  He has no rales  Nursing note and vitals reviewed

## 2019-03-19 ENCOUNTER — TELEPHONE (OUTPATIENT)
Dept: FAMILY MEDICINE CLINIC | Facility: CLINIC | Age: 54
End: 2019-03-19

## 2019-03-19 DIAGNOSIS — J30.89 NON-SEASONAL ALLERGIC RHINITIS, UNSPECIFIED TRIGGER: ICD-10-CM

## 2019-03-19 RX ORDER — PREDNISONE 1 MG/1
5 TABLET ORAL DAILY
Qty: 10 TABLET | Refills: 0 | Status: SHIPPED | OUTPATIENT
Start: 2019-03-19 | End: 2019-03-20 | Stop reason: SDUPTHER

## 2019-03-19 NOTE — TELEPHONE ENCOUNTER
I would much prefer that he see the allergist rather than just continue to increase or use more steroids  He has the appointment on Monday, so he should follow with them then  At this time, will renew the 5 mg daily, but I do not wish to increase if we can avoid it

## 2019-03-19 NOTE — TELEPHONE ENCOUNTER
Pt wife called, Ayleen Vasquez has an appointment with the allergist on 3/25  She would like to know if you can send in more prednisone to last him until Monday, he has 2 days left so he would need 4 pills  Also she asks if this can be increased, He almost went to the ER last night due to not being able to breathe, but prednisone is the only thing that helps  Please call Rena Alvarez back  [phone] Thanks!

## 2019-03-19 NOTE — TELEPHONE ENCOUNTER
Called pt's spouse back, explained what TH stated below and if pt doesn't feel good while being on 5mg and sob then pt need to go right to the emergency room   R Rod

## 2019-03-20 ENCOUNTER — OFFICE VISIT (OUTPATIENT)
Dept: FAMILY MEDICINE CLINIC | Facility: CLINIC | Age: 54
End: 2019-03-20
Payer: COMMERCIAL

## 2019-03-20 VITALS
SYSTOLIC BLOOD PRESSURE: 122 MMHG | WEIGHT: 162 LBS | HEART RATE: 72 BPM | DIASTOLIC BLOOD PRESSURE: 76 MMHG | BODY MASS INDEX: 27.66 KG/M2 | RESPIRATION RATE: 16 BRPM | HEIGHT: 64 IN

## 2019-03-20 DIAGNOSIS — J30.89 NON-SEASONAL ALLERGIC RHINITIS, UNSPECIFIED TRIGGER: ICD-10-CM

## 2019-03-20 DIAGNOSIS — J45.41 MODERATE PERSISTENT ASTHMA WITH ACUTE EXACERBATION: Primary | ICD-10-CM

## 2019-03-20 PROCEDURE — 3008F BODY MASS INDEX DOCD: CPT | Performed by: FAMILY MEDICINE

## 2019-03-20 PROCEDURE — 1036F TOBACCO NON-USER: CPT | Performed by: FAMILY MEDICINE

## 2019-03-20 PROCEDURE — 99213 OFFICE O/P EST LOW 20 MIN: CPT | Performed by: FAMILY MEDICINE

## 2019-03-20 RX ORDER — PREDNISONE 10 MG/1
10 TABLET ORAL 2 TIMES DAILY WITH MEALS
Qty: 20 TABLET | Refills: 0 | Status: SHIPPED | OUTPATIENT
Start: 2019-03-20 | End: 2019-03-30

## 2019-03-20 NOTE — ASSESSMENT & PLAN NOTE
Patient appears to be continuing to have problems with asthma  Unfortunately, the medications have not really helped enough at this point  He continues to have inflammation and irritation  Recommend prednisone be increased to 10 mg tablets twice a day  Follow with Allergy on Monday  Consider Xolair, but I defer to Allergy and immunology  We did discuss the possibility of changing jobs as his current employment does require significant exposure to the environment  He currently works in [employer]

## 2019-03-20 NOTE — PATIENT INSTRUCTIONS
Problem List Items Addressed This Visit     Allergic rhinitis     Prednisone to continue, but needs to see allergy and immunology  Relevant Medications    predniSONE 10 mg tablet    Moderate persistent asthma with acute exacerbation - Primary     Patient appears to be continuing to have problems with asthma  Unfortunately, the medications have not really helped enough at this point  He continues to have inflammation and irritation  Recommend prednisone be increased to 10 mg tablets twice a day  Follow with Allergy on Monday  Consider Xolair, but I defer to Allergy and immunology  We did discuss the possibility of changing jobs as his current employment does require significant exposure to the environment  He currently works in [employer]

## 2019-03-20 NOTE — PROGRESS NOTES
Assessment and Plan:    Problem List Items Addressed This Visit     Allergic rhinitis     Prednisone to continue, but needs to see allergy and immunology  Relevant Medications    predniSONE 10 mg tablet    Moderate persistent asthma with acute exacerbation - Primary     Patient appears to be continuing to have problems with asthma  Unfortunately, the medications have not really helped enough at this point  He continues to have inflammation and irritation  Recommend prednisone be increased to 10 mg tablets twice a day  Follow with Allergy on Monday  Consider Xolair, but I defer to Allergy and immunology  We did discuss the possibility of changing jobs as his current employment does require significant exposure to the environment  He currently works in [employer]  Diagnoses and all orders for this visit:    Moderate persistent asthma with acute exacerbation    Non-seasonal allergic rhinitis, unspecified trigger  -     predniSONE 10 mg tablet; Take 1 tablet (10 mg total) by mouth 2 (two) times a day with meals for 10 days              Subjective:      Patient ID: Brynn Whitt is a 46 y o  male  CC:    Chief Complaint   Patient presents with    Shortness of Breath     can not breathe wants prednisone  GEO Velasco       HPI:    The patient has been having some problems with breathing recently  He has had increase in medications, but that has not seemed to control the symptoms  He currently is having increased wheezing, shortness of breath  When he has this, it is severe enough that he has contemplated going to the emergency room  10 mg daily prednisone seem to block the symptoms enough that he did not need to do anything else  Unfortunately, despite the prescription being sent to the pharmacy for 5 mg tablets, they would not fill the prescription stating that the insurance would not allow it  Of note, the patient is currently using 5 mg twice a day of prednisone    Despite this, he is having more problems  He does have an appointment within Allergy and immunology doctor on Monday  The following portions of the patient's history were reviewed and updated as appropriate: allergies, current medications and problem list       Review of Systems   Constitutional: Positive for activity change and fatigue  HENT: Negative  Respiratory: Positive for cough, shortness of breath and wheezing  Data to review:       Objective:    Vitals:    03/20/19 1521   BP: 122/76   BP Location: Left arm   Patient Position: Sitting   Cuff Size: Large   Pulse: 72   Resp: 16   Weight: 73 5 kg (162 lb)   Height: 5' 4" (1 626 m)        Physical Exam   Constitutional: He appears well-developed and well-nourished  HENT:   Head: Normocephalic and atraumatic  Neck: Normal range of motion  Neck supple  Cardiovascular: Normal rate, regular rhythm and normal heart sounds  Exam reveals no gallop and no friction rub  No murmur heard  Pulses:       Carotid pulses are 2+ on the right side, and 2+ on the left side  Pulmonary/Chest: Effort normal  No respiratory distress  He has decreased breath sounds (minimal)  He has no wheezes  He has no rales  Nursing note and vitals reviewed

## 2019-03-26 ENCOUNTER — TELEPHONE (OUTPATIENT)
Dept: FAMILY MEDICINE CLINIC | Facility: CLINIC | Age: 54
End: 2019-03-26

## 2019-03-26 DIAGNOSIS — J45.41 MODERATE PERSISTENT ASTHMA WITH ACUTE EXACERBATION: Primary | ICD-10-CM

## 2019-03-26 NOTE — ASSESSMENT & PLAN NOTE
Received a call from allergist   Patient has been having increased problems  They mentioned that doing spirometry it appeared to be all upper airway symptoms  At this point, he ordered CT of the neck, as well as an ENT consult  Will follow up with both in the future as well  Allergist was not sure what exactly the issue was with the patient at the moment

## 2019-03-26 NOTE — TELEPHONE ENCOUNTER
Problem List Items Addressed This Visit     Moderate persistent asthma with acute exacerbation - Primary     Received a call from allergist   Patient has been having increased problems  They mentioned that doing spirometry it appeared to be all upper airway symptoms  At this point, he ordered CT of the neck, as well as an ENT consult  Will follow up with both in the future as well  Allergist was not sure what exactly the issue was with the patient at the moment

## 2019-03-27 ENCOUNTER — TELEPHONE (OUTPATIENT)
Dept: FAMILY MEDICINE CLINIC | Facility: CLINIC | Age: 54
End: 2019-03-27

## 2019-03-27 DIAGNOSIS — J01.30 ACUTE NON-RECURRENT SPHENOIDAL SINUSITIS: Primary | ICD-10-CM

## 2019-03-27 RX ORDER — AMOXICILLIN AND CLAVULANATE POTASSIUM 875; 125 MG/1; MG/1
1 TABLET, FILM COATED ORAL EVERY 12 HOURS SCHEDULED
Qty: 20 TABLET | Refills: 0 | Status: SHIPPED | OUTPATIENT
Start: 2019-03-27 | End: 2019-04-06

## 2019-03-27 NOTE — TELEPHONE ENCOUNTER
Please let Dr Jose Rafael Alas know that I will treat sinusitis  For the patient: He should follow with ENT, and I would like him to be on ABX  Should be Augmentin if OK with Penicillins  I called and spoke to patient  He has had Augmentin in the past and does well  Please send RX to [address]  I also called Dr Yanely Bernardo office and [facility] for them that we are treating pt

## 2019-05-09 ENCOUNTER — OFFICE VISIT (OUTPATIENT)
Dept: FAMILY MEDICINE CLINIC | Facility: CLINIC | Age: 54
End: 2019-05-09
Payer: COMMERCIAL

## 2019-05-09 VITALS
HEIGHT: 64 IN | SYSTOLIC BLOOD PRESSURE: 120 MMHG | DIASTOLIC BLOOD PRESSURE: 74 MMHG | HEART RATE: 76 BPM | BODY MASS INDEX: 27.35 KG/M2 | WEIGHT: 160.2 LBS

## 2019-05-09 DIAGNOSIS — J30.89 NON-SEASONAL ALLERGIC RHINITIS, UNSPECIFIED TRIGGER: Primary | ICD-10-CM

## 2019-05-09 DIAGNOSIS — J01.30 ACUTE NON-RECURRENT SPHENOIDAL SINUSITIS: ICD-10-CM

## 2019-05-09 DIAGNOSIS — K21.9 LARYNGOPHARYNGEAL REFLUX (LPR): ICD-10-CM

## 2019-05-09 PROBLEM — J06.9 ACUTE UPPER RESPIRATORY INFECTION: Status: RESOLVED | Noted: 2019-01-02 | Resolved: 2019-05-09

## 2019-05-09 PROCEDURE — 3008F BODY MASS INDEX DOCD: CPT | Performed by: FAMILY MEDICINE

## 2019-05-09 PROCEDURE — 99214 OFFICE O/P EST MOD 30 MIN: CPT | Performed by: FAMILY MEDICINE

## 2019-05-09 PROCEDURE — 1036F TOBACCO NON-USER: CPT | Performed by: FAMILY MEDICINE

## 2019-05-09 RX ORDER — MONTELUKAST SODIUM 10 MG/1
10 TABLET ORAL
COMMUNITY
End: 2019-12-10 | Stop reason: SDUPTHER

## 2019-05-09 RX ORDER — DEXLANSOPRAZOLE 60 MG/1
60 CAPSULE, DELAYED RELEASE ORAL DAILY
Qty: 30 CAPSULE | Refills: 2 | Status: SHIPPED | OUTPATIENT
Start: 2019-05-09 | End: 2019-07-11 | Stop reason: CLARIF

## 2019-05-09 RX ORDER — RANITIDINE 150 MG/1
150 CAPSULE ORAL 2 TIMES DAILY
COMMUNITY
End: 2020-06-24

## 2019-05-09 RX ORDER — LORATADINE 10 MG/1
10 TABLET ORAL 2 TIMES DAILY
COMMUNITY

## 2019-06-10 ENCOUNTER — TELEPHONE (OUTPATIENT)
Dept: FAMILY MEDICINE CLINIC | Facility: CLINIC | Age: 54
End: 2019-06-10

## 2019-06-10 DIAGNOSIS — K21.00 REFLUX ESOPHAGITIS: Primary | ICD-10-CM

## 2019-06-11 RX ORDER — PANTOPRAZOLE SODIUM 40 MG/1
40 TABLET, DELAYED RELEASE ORAL
Qty: 30 TABLET | Refills: 5 | Status: SHIPPED | OUTPATIENT
Start: 2019-06-11 | End: 2019-07-11 | Stop reason: ALTCHOICE

## 2019-07-11 ENCOUNTER — OFFICE VISIT (OUTPATIENT)
Dept: FAMILY MEDICINE CLINIC | Facility: CLINIC | Age: 54
End: 2019-07-11
Payer: COMMERCIAL

## 2019-07-11 VITALS
DIASTOLIC BLOOD PRESSURE: 78 MMHG | HEART RATE: 68 BPM | BODY MASS INDEX: 25.61 KG/M2 | WEIGHT: 150 LBS | SYSTOLIC BLOOD PRESSURE: 122 MMHG | TEMPERATURE: 98.9 F | HEIGHT: 64 IN

## 2019-07-11 DIAGNOSIS — J45.41 MODERATE PERSISTENT ASTHMA WITH ACUTE EXACERBATION: ICD-10-CM

## 2019-07-11 DIAGNOSIS — J30.89 NON-SEASONAL ALLERGIC RHINITIS, UNSPECIFIED TRIGGER: ICD-10-CM

## 2019-07-11 DIAGNOSIS — K21.9 LARYNGOPHARYNGEAL REFLUX (LPR): Primary | ICD-10-CM

## 2019-07-11 PROCEDURE — 99214 OFFICE O/P EST MOD 30 MIN: CPT | Performed by: FAMILY MEDICINE

## 2019-07-11 PROCEDURE — 3008F BODY MASS INDEX DOCD: CPT | Performed by: FAMILY MEDICINE

## 2019-07-11 PROCEDURE — 1036F TOBACCO NON-USER: CPT | Performed by: FAMILY MEDICINE

## 2019-07-11 RX ORDER — LANSOPRAZOLE 30 MG/1
30 CAPSULE, DELAYED RELEASE ORAL DAILY
Qty: 30 CAPSULE | Refills: 2 | Status: SHIPPED | OUTPATIENT
Start: 2019-07-11 | End: 2019-08-30 | Stop reason: SDUPTHER

## 2019-07-11 NOTE — ASSESSMENT & PLAN NOTE
Patient did report that he went to Allergy and immunology  They recommended him to consider allergy desensitization injections series

## 2019-07-11 NOTE — PATIENT INSTRUCTIONS
Problem List Items Addressed This Visit     Allergic rhinitis     Patient did report that he went to Allergy and immunology  They recommended him to consider allergy desensitization injections series  Laryngopharyngeal reflux (LPR) - Primary     Patient does have laryngeal pharyngeal reflux  So far, the pantoprazole has not helped at all, even with adding Zantac  He did not have the same problem on Dexilant  At this time, trial of Prevacid, and then re-evaluate  If the Prevacid does not work, then we must go with Chuy Grace  Relevant Medications    lansoprazole (PREVACID) 30 mg capsule    Moderate persistent asthma with acute exacerbation     Stable at the moment  No changes  Follow-up as scheduled  Continue inhalers

## 2019-07-11 NOTE — PROGRESS NOTES
Assessment and Plan:    Problem List Items Addressed This Visit     Allergic rhinitis     Patient did report that he went to Allergy and immunology  They recommended him to consider allergy desensitization injections series  Laryngopharyngeal reflux (LPR) - Primary     Patient does have laryngeal pharyngeal reflux  So far, the pantoprazole has not helped at all, even with adding Zantac  He did not have the same problem on Dexilant  At this time, trial of Prevacid, and then re-evaluate  If the Prevacid does not work, then we must go with Mahin Stoddard  Relevant Medications    lansoprazole (PREVACID) 30 mg capsule    Moderate persistent asthma with acute exacerbation     Stable at the moment  No changes  Follow-up as scheduled  Continue inhalers  Diagnoses and all orders for this visit:    Laryngopharyngeal reflux (LPR)  -     lansoprazole (PREVACID) 30 mg capsule; Take 1 capsule (30 mg total) by mouth daily for 90 days    Moderate persistent asthma with acute exacerbation    Non-seasonal allergic rhinitis, unspecified trigger              Subjective:      Patient ID: Francisca Lindsey is a 46 y o  male  CC:    Chief Complaint   Patient presents with    Cough     Pt states he has alot of Phlegm in the throat and a sore sensation  He thinks it is his Reflux but since the insurance no longer covers Dexilant and he has tried the Pantoprazole and Zantac with no relief  kw    Sore Throat       HPI:    Patient is using Protonix  It does not seem to change anything with regard to the reflux  When he was on Dexilant, he did have a significant improvement in his symptoms  Unfortunately, Mahin Stoddard is no longer covered by his insurance, or is covered with a very large co-pay  Patient did mention that the pantoprazole still needs him to take Zantac at [facility], and then with that he still has problems  So far, he states that his breathing has been relatively good    This is with regard to his asthma  He did mention that he went to an allergist and immunologist, they recommended that he consider desensitization shots for his allergies  Patient has been going to work  He has not had exposure recently to chemicals  He did mention that if he has exposure, he will use the respirator immediately  The following portions of the patient's history were reviewed and updated as appropriate: allergies, current medications, past family history, past medical history, past social history, past surgical history and problem list       Review of Systems   Constitutional: Negative  HENT:        Per HPI     Eyes: Negative  Respiratory: Negative  Cardiovascular: Negative  Gastrointestinal:        Per HPI     Endocrine: Negative  Genitourinary: Negative  Musculoskeletal: Negative  Skin: Negative  Allergic/Immunologic: Negative  Neurological: Negative  Hematological: Negative  Psychiatric/Behavioral: Negative  Data to review:       Objective:    Vitals:    07/11/19 1044   BP: 122/78   BP Location: Left arm   Patient Position: Sitting   Pulse: 68   Temp: 98 9 °F (37 2 °C)   TempSrc: Tympanic   Weight: 68 kg (150 lb)   Height: 5' 4" (1 626 m)        Physical Exam   Constitutional: He appears well-developed and well-nourished  HENT:   Head: Normocephalic and atraumatic  Neck: Normal range of motion  Neck supple  Cardiovascular: Normal rate, regular rhythm and normal heart sounds  Exam reveals no gallop and no friction rub  No murmur heard  Pulses:       Carotid pulses are 2+ on the right side, and 2+ on the left side  Pulmonary/Chest: Effort normal and breath sounds normal  No respiratory distress  He has no wheezes  He has no rales  Nursing note and vitals reviewed  BMI Counseling: Body mass index is 25 75 kg/m²  Discussed the patient's BMI with him  The BMI is above average  BMI counseling and education was provided to the patient   Nutrition recommendations include reducing portion sizes, decreasing overall calorie intake, 3-5 servings of fruits/vegetables daily, reducing fast food intake, consuming healthier snacks, decreasing soda and/or juice intake, moderation in carbohydrate intake, increasing intake of lean protein, reducing intake of saturated fat and trans fat and reducing intake of cholesterol  Exercise recommendations include exercising 3-5 times per week

## 2019-07-11 NOTE — ASSESSMENT & PLAN NOTE
Patient does have laryngeal pharyngeal reflux  So far, the pantoprazole has not helped at all, even with adding Zantac  He did not have the same problem on Dexilant  At this time, trial of Prevacid, and then re-evaluate  If the Prevacid does not work, then we must go with Marcos Juarez

## 2019-08-30 DIAGNOSIS — K21.9 LARYNGOPHARYNGEAL REFLUX (LPR): ICD-10-CM

## 2019-08-30 RX ORDER — LANSOPRAZOLE 30 MG/1
30 CAPSULE, DELAYED RELEASE ORAL DAILY
Qty: 90 CAPSULE | Refills: 1 | Status: SHIPPED | OUTPATIENT
Start: 2019-08-30 | End: 2020-03-03

## 2019-09-30 ENCOUNTER — OFFICE VISIT (OUTPATIENT)
Dept: FAMILY MEDICINE CLINIC | Facility: CLINIC | Age: 54
End: 2019-09-30
Payer: COMMERCIAL

## 2019-09-30 VITALS
HEIGHT: 64 IN | WEIGHT: 148 LBS | HEART RATE: 76 BPM | DIASTOLIC BLOOD PRESSURE: 80 MMHG | TEMPERATURE: 98.1 F | BODY MASS INDEX: 25.27 KG/M2 | SYSTOLIC BLOOD PRESSURE: 130 MMHG

## 2019-09-30 DIAGNOSIS — J01.40 ACUTE NON-RECURRENT PANSINUSITIS: Primary | ICD-10-CM

## 2019-09-30 DIAGNOSIS — K21.9 LARYNGOPHARYNGEAL REFLUX (LPR): ICD-10-CM

## 2019-09-30 PROCEDURE — 99214 OFFICE O/P EST MOD 30 MIN: CPT | Performed by: FAMILY MEDICINE

## 2019-09-30 RX ORDER — SULFAMETHOXAZOLE AND TRIMETHOPRIM 800; 160 MG/1; MG/1
1 TABLET ORAL EVERY 12 HOURS SCHEDULED
Qty: 20 TABLET | Refills: 0 | Status: SHIPPED | OUTPATIENT
Start: 2019-09-30 | End: 2019-10-10

## 2019-09-30 NOTE — ASSESSMENT & PLAN NOTE
Patient was concerned about the generic ranitidine recall  There is a voluntary recall on Zantac generic from 1 manufacture currently  At this point, I would recommend that he continue on the Zantac in confirm whether not his particular version has been recall to, but if he has concerns about it, he can switch to Pepcid (famotidine) 20 mg twice a day, or 40 mg once a day

## 2019-09-30 NOTE — PATIENT INSTRUCTIONS
Problem List Items Addressed This Visit     Laryngopharyngeal reflux (LPR)     Patient was concerned about the generic ranitidine recall  There is a voluntary recall on Zantac generic from 1 manufacture currently  At this point, I would recommend that he continue on the Zantac in confirm whether not his particular version has been recall to, but if he has concerns about it, he can switch to Pepcid (famotidine) 20 mg twice a day, or 40 mg once a day  Other Visit Diagnoses     Acute non-recurrent pansinusitis    -  Primary    Bactrim DS  Follow in 2 weeks as needed        Relevant Medications    sulfamethoxazole-trimethoprim (BACTRIM DS) 800-160 mg per tablet

## 2019-09-30 NOTE — PROGRESS NOTES
Assessment and Plan:    Problem List Items Addressed This Visit     Laryngopharyngeal reflux (LPR)     Patient was concerned about the generic ranitidine recall  There is a voluntary recall on Zantac generic from 1 manufacture currently  At this point, I would recommend that he continue on the Zantac in confirm whether not his particular version has been recall to, but if he has concerns about it, he can switch to Pepcid (famotidine) 20 mg twice a day, or 40 mg once a day  Other Visit Diagnoses     Acute non-recurrent pansinusitis    -  Primary    Bactrim DS  Follow in 2 weeks as needed  Relevant Medications    sulfamethoxazole-trimethoprim (BACTRIM DS) 800-160 mg per tablet                 Diagnoses and all orders for this visit:    Acute non-recurrent pansinusitis  Comments:  Bactrim DS  Follow in 2 weeks as needed  Orders:  -     sulfamethoxazole-trimethoprim (BACTRIM DS) 800-160 mg per tablet; Take 1 tablet by mouth every 12 (twelve) hours for 10 days    Laryngopharyngeal reflux (LPR)              Subjective:      Patient ID: Michael Patel is a 53 y o  male  CC:    Chief Complaint   Patient presents with    Headache     Onset 2 weeks ago  mjs    Fever    Sore Throat       HPI:    For the last 2 weeks or so, happening on or off, the patient has been having some facial pain, tooth pain, worse when he bends over  Significant postnasal drip along with this  He thought it may be a sinus infection  He also has sore throat, fatigue  Currently using Motrin for the fever and aches  He does notice the medication weariing off  He is not having issues with asthma currently  The following portions of the patient's history were reviewed and updated as appropriate: allergies, current medications, past family history, past medical history, past social history, past surgical history and problem list       Review of Systems   Constitutional: Positive for fatigue     HENT: Positive for congestion, postnasal drip, rhinorrhea, sinus pressure, sinus pain and sore throat  Eyes: Negative  Respiratory: Positive for cough  Cardiovascular: Negative  Data to review:       Objective:    Vitals:    09/30/19 1439   BP: 130/80   Pulse: 76   Temp: 98 1 °F (36 7 °C)   Weight: 67 1 kg (148 lb)   Height: 5' 4" (1 626 m)        Physical Exam   Constitutional: He appears well-developed and well-nourished  HENT:   Head: Normocephalic and atraumatic  Right Ear: Hearing, tympanic membrane, external ear and ear canal normal    Left Ear: Hearing, tympanic membrane, external ear and ear canal normal    Nose: Mucosal edema and rhinorrhea present  No foreign bodies  Right sinus exhibits maxillary sinus tenderness and frontal sinus tenderness  Left sinus exhibits maxillary sinus tenderness and frontal sinus tenderness  Mouth/Throat: Uvula is midline, oropharynx is clear and moist and mucous membranes are normal  Tonsils are 0 on the right  Tonsils are 0 on the left  No tonsillar exudate  Neck: Normal range of motion  Neck supple  Cardiovascular: Normal rate, regular rhythm and normal heart sounds  Exam reveals no gallop and no friction rub  No murmur heard  Pulses:       Carotid pulses are 2+ on the right side, and 2+ on the left side  Pulmonary/Chest: Effort normal and breath sounds normal  No respiratory distress  He has no wheezes  He has no rales  Lymphadenopathy:     He has no cervical adenopathy  Nursing note and vitals reviewed

## 2019-10-08 ENCOUNTER — OFFICE VISIT (OUTPATIENT)
Dept: FAMILY MEDICINE CLINIC | Facility: CLINIC | Age: 54
End: 2019-10-08
Payer: COMMERCIAL

## 2019-10-08 VITALS
TEMPERATURE: 98.2 F | HEART RATE: 76 BPM | DIASTOLIC BLOOD PRESSURE: 70 MMHG | HEIGHT: 64 IN | WEIGHT: 147 LBS | RESPIRATION RATE: 18 BRPM | BODY MASS INDEX: 25.1 KG/M2 | SYSTOLIC BLOOD PRESSURE: 120 MMHG

## 2019-10-08 DIAGNOSIS — J11.1 INFLUENZA: Primary | ICD-10-CM

## 2019-10-08 PROCEDURE — 99213 OFFICE O/P EST LOW 20 MIN: CPT | Performed by: FAMILY MEDICINE

## 2019-10-08 RX ORDER — OSELTAMIVIR PHOSPHATE 75 MG/1
75 CAPSULE ORAL EVERY 12 HOURS SCHEDULED
Qty: 10 CAPSULE | Refills: 0 | Status: SHIPPED | OUTPATIENT
Start: 2019-10-08 | End: 2019-10-13

## 2019-10-08 NOTE — PROGRESS NOTES
Assessment and Plan:    Problem List Items Addressed This Visit     None      Visit Diagnoses     Influenza    -  Primary    Influenza by symptoms  Treat with Tamiflu  Finish antibiotics from sinus infection from last office visit  Relevant Medications    oseltamivir (TAMIFLU) 75 mg capsule                 Diagnoses and all orders for this visit:    Influenza  Comments:  Influenza by symptoms  Treat with Tamiflu  Finish antibiotics from sinus infection from last office visit  Orders:  -     oseltamivir (TAMIFLU) 75 mg capsule; Take 1 capsule (75 mg total) by mouth every 12 (twelve) hours for 5 days              Subjective:      Patient ID: Astrid Sommer is a 53 y o  male  CC:    Chief Complaint   Patient presents with    Cold Like Symptoms     Patient present today for possible flu like symptoms including headaches, body aches, chills, runny nose, nasal congestion, mild dry cough and on and off fevers of 101  HPI:    Please see chief complaint  Patient is having fever, chills, aches, minimal shortness of breath  Temperature has been up to 102  Currently using Motrin  He is already on antibiotics  The following portions of the patient's history were reviewed and updated as appropriate: allergies, current medications and problem list       Review of Systems   Constitutional: Positive for chills, fatigue and fever  HENT: Positive for congestion  Eyes: Negative  Respiratory: Positive for shortness of breath and wheezing  Cardiovascular: Negative  Gastrointestinal: Negative  Data to review:       Objective:    Vitals:    10/08/19 1352   BP: 120/70   BP Location: Left arm   Patient Position: Sitting   Cuff Size: Standard   Pulse: 76   Resp: 18   Temp: 98 2 °F (36 8 °C)   TempSrc: Temporal   Weight: 66 7 kg (147 lb)   Height: 5' 4" (1 626 m)        Physical Exam   Constitutional: He appears well-developed and well-nourished  HENT:   Head: Normocephalic and atraumatic  Neck: Normal range of motion  Neck supple  Cardiovascular: Normal rate, regular rhythm and normal heart sounds  Exam reveals no gallop and no friction rub  No murmur heard  Pulses:       Carotid pulses are 2+ on the right side, and 2+ on the left side  Pulmonary/Chest: Effort normal  No respiratory distress  He has wheezes  He has no rales  Lymphadenopathy:     He has no cervical adenopathy  Nursing note and vitals reviewed

## 2019-10-08 NOTE — LETTER
October 8, 2019     Patient: Moises Hearn   YOB: 1965   Date of Visit: 10/8/2019       To Whom it May Concern:    Moises Hearn is under my professional care  He was seen in my office on 10/8/2019  He may return to work on 10Oct2019  If you have any questions or concerns, please don't hesitate to call           Sincerely,          Phuong Owens MD        CC: No Recipients

## 2019-10-08 NOTE — PATIENT INSTRUCTIONS
Problem List Items Addressed This Visit     None      Visit Diagnoses     Influenza    -  Primary    Influenza by symptoms  Treat with Tamiflu  Finish antibiotics from sinus infection from last office visit      Relevant Medications    oseltamivir (TAMIFLU) 75 mg capsule

## 2019-10-11 ENCOUNTER — OFFICE VISIT (OUTPATIENT)
Dept: FAMILY MEDICINE CLINIC | Facility: CLINIC | Age: 54
End: 2019-10-11
Payer: COMMERCIAL

## 2019-10-11 VITALS
SYSTOLIC BLOOD PRESSURE: 110 MMHG | HEART RATE: 68 BPM | WEIGHT: 145 LBS | BODY MASS INDEX: 24.75 KG/M2 | HEIGHT: 64 IN | DIASTOLIC BLOOD PRESSURE: 70 MMHG

## 2019-10-11 DIAGNOSIS — Z12.11 SCREEN FOR COLON CANCER: ICD-10-CM

## 2019-10-11 DIAGNOSIS — R10.13 EPIGASTRIC PAIN: ICD-10-CM

## 2019-10-11 DIAGNOSIS — K21.9 LARYNGOPHARYNGEAL REFLUX (LPR): Primary | ICD-10-CM

## 2019-10-11 PROCEDURE — 99213 OFFICE O/P EST LOW 20 MIN: CPT | Performed by: FAMILY MEDICINE

## 2019-10-11 PROCEDURE — 3008F BODY MASS INDEX DOCD: CPT | Performed by: FAMILY MEDICINE

## 2019-10-11 NOTE — PATIENT INSTRUCTIONS
Problem List Items Addressed This Visit     Laryngopharyngeal reflux (LPR) - Primary     Patient does have LPR, which is likely some of the cause of some of his abdominal discomfort  He is using Prevacid and Zantac, but I would recommend starting another treatment  Patient could try Tums for significant issues with heartburn/abdominal discomfort  Alternatively, we could try Carafate vs double Prevacid to BID  Other Visit Diagnoses     Epigastric pain        Likely due to LPR  No findings of GB      Screen for colon cancer        Relevant Orders    Occult Blood, Fecal Immunochemical

## 2019-10-11 NOTE — ASSESSMENT & PLAN NOTE
Patient does have LPR, which is likely some of the cause of some of his abdominal discomfort  He is using Prevacid and Zantac, but I would recommend starting another treatment  Patient could try Tums for significant issues with heartburn/abdominal discomfort  Alternatively, we could try Carafate vs double Prevacid to BID

## 2019-10-11 NOTE — PROGRESS NOTES
Assessment and Plan:    Problem List Items Addressed This Visit     Laryngopharyngeal reflux (LPR) - Primary     Patient does have LPR, which is likely some of the cause of some of his abdominal discomfort  He is using Prevacid and Zantac, but I would recommend starting another treatment  Patient could try Tums for significant issues with heartburn/abdominal discomfort  Alternatively, we could try Carafate vs double Prevacid to BID  Other Visit Diagnoses     Epigastric pain        Likely due to LPR  No findings of GB  Screen for colon cancer        Relevant Orders    Occult Blood, Fecal Immunochemical                 Diagnoses and all orders for this visit:    Laryngopharyngeal reflux (LPR)    Epigastric pain  Comments:  Likely due to LPR  No findings of GB  Screen for colon cancer  -     Occult Blood, Fecal Immunochemical; Future              Subjective:      Patient ID: Frandy Head is a 53 y o  male  CC:    Chief Complaint   Patient presents with    Abdominal Pain     pt states it almost feels like a muscle strain  it did bother him after eating diner yesterday  he states it hurts more with movement~cd       HPI:    Wednesday, he noted some abdominal soreness  Yesterday, same  Thursday night, had some increased discomfort after eating spagetti  He was told to check to confirm is not GB  No N/V/D  He is passing gas, normal BM  No urinary issues  Patient does have a history of reflux, LPR  Currently using Prevacid 30 mg  With regard to asthma:  No particular issues at the moment            The following portions of the patient's history were reviewed and updated as appropriate: allergies, current medications, past family history, past medical history, past social history, past surgical history and problem list       Review of Systems      Data to review:       Objective:    Vitals:    10/11/19 1101   BP: 110/70   BP Location: Left arm   Patient Position: Sitting   Cuff Size: Standard   Pulse: 68   Weight: 65 8 kg (145 lb)   Height: 5' 4" (1 626 m)        Physical Exam

## 2019-12-10 DIAGNOSIS — J30.89 NON-SEASONAL ALLERGIC RHINITIS, UNSPECIFIED TRIGGER: Primary | ICD-10-CM

## 2019-12-11 RX ORDER — MONTELUKAST SODIUM 10 MG/1
10 TABLET ORAL
Qty: 90 TABLET | Refills: 1 | Status: SHIPPED | OUTPATIENT
Start: 2019-12-11 | End: 2020-06-01

## 2020-03-02 DIAGNOSIS — K21.9 LARYNGOPHARYNGEAL REFLUX (LPR): ICD-10-CM

## 2020-03-03 RX ORDER — LANSOPRAZOLE 30 MG/1
30 CAPSULE, DELAYED RELEASE ORAL DAILY
Qty: 90 CAPSULE | Refills: 1 | Status: SHIPPED | OUTPATIENT
Start: 2020-03-03 | End: 2020-08-31

## 2020-05-31 DIAGNOSIS — J30.89 NON-SEASONAL ALLERGIC RHINITIS, UNSPECIFIED TRIGGER: ICD-10-CM

## 2020-06-01 RX ORDER — MONTELUKAST SODIUM 10 MG/1
TABLET ORAL
Qty: 90 TABLET | Refills: 1 | Status: SHIPPED | OUTPATIENT
Start: 2020-06-01 | End: 2020-12-09

## 2020-06-24 ENCOUNTER — TELEMEDICINE (OUTPATIENT)
Dept: FAMILY MEDICINE CLINIC | Facility: CLINIC | Age: 55
End: 2020-06-24
Payer: COMMERCIAL

## 2020-06-24 VITALS — TEMPERATURE: 98.3 F

## 2020-06-24 DIAGNOSIS — K21.9 LARYNGOPHARYNGEAL REFLUX (LPR): ICD-10-CM

## 2020-06-24 DIAGNOSIS — J01.30 ACUTE NON-RECURRENT SPHENOIDAL SINUSITIS: Primary | ICD-10-CM

## 2020-06-24 PROCEDURE — 99213 OFFICE O/P EST LOW 20 MIN: CPT | Performed by: FAMILY MEDICINE

## 2020-06-24 RX ORDER — AZITHROMYCIN 250 MG/1
TABLET, FILM COATED ORAL
Qty: 6 TABLET | Refills: 0 | Status: SHIPPED | OUTPATIENT
Start: 2020-06-24 | End: 2020-06-29

## 2020-07-17 ENCOUNTER — TELEPHONE (OUTPATIENT)
Dept: FAMILY MEDICINE CLINIC | Facility: CLINIC | Age: 55
End: 2020-07-17

## 2020-07-17 DIAGNOSIS — J01.30 ACUTE NON-RECURRENT SPHENOIDAL SINUSITIS: Primary | ICD-10-CM

## 2020-07-17 RX ORDER — CEFUROXIME AXETIL 500 MG/1
500 TABLET ORAL EVERY 12 HOURS SCHEDULED
Qty: 20 TABLET | Refills: 0 | Status: SHIPPED | OUTPATIENT
Start: 2020-07-17 | End: 2020-07-27

## 2020-07-17 NOTE — TELEPHONE ENCOUNTER
Pharmacy calling stating, Pt takes lansoprazole (PREVACID) 30 mg capsule, and they state this is a drug interaction  Prevacid decreases the efficacy of Ceftin  Please advise

## 2020-07-17 NOTE — TELEPHONE ENCOUNTER
Patient has recurrent symptoms  Will try Ceftin  With this does not help, needs office visit, or ENT

## 2020-07-17 NOTE — TELEPHONE ENCOUNTER
Pt wife called in because pt has  SORE THROAT, SINUS PRESSURE, NO FEVER again and will like to know if Dr Graciela Holt can send something in

## 2020-08-30 DIAGNOSIS — K21.9 LARYNGOPHARYNGEAL REFLUX (LPR): ICD-10-CM

## 2020-08-31 RX ORDER — LANSOPRAZOLE 30 MG/1
30 CAPSULE, DELAYED RELEASE ORAL DAILY
Qty: 90 CAPSULE | Refills: 1 | Status: SHIPPED | OUTPATIENT
Start: 2020-08-31 | End: 2022-06-02

## 2020-12-05 DIAGNOSIS — J30.89 NON-SEASONAL ALLERGIC RHINITIS, UNSPECIFIED TRIGGER: ICD-10-CM

## 2020-12-09 RX ORDER — MONTELUKAST SODIUM 10 MG/1
TABLET ORAL
Qty: 90 TABLET | Refills: 1 | Status: SHIPPED | OUTPATIENT
Start: 2020-12-09 | End: 2021-06-01

## 2021-01-29 ENCOUNTER — TELEPHONE (OUTPATIENT)
Dept: FAMILY MEDICINE CLINIC | Facility: CLINIC | Age: 56
End: 2021-01-29

## 2021-01-29 NOTE — TELEPHONE ENCOUNTER
HAS A LOT OF QUESTIONS ABOUT BEING EXPOSED TO COVID AT WORK  HE HAS NO SYMPTOMS BUT VERY UPSET THAT SOMEONE WOULD COME TO WORK SICK    PLEASE BUT HIS MIND TO FLORIN Brown [phone]

## 2021-01-30 NOTE — TELEPHONE ENCOUNTER
Pt states his coworker was in the office working with out a mask on knowing he was covid positive  Pt did have a mask on and did not have direct contact with the coworker he was just in the room away from him  Pt denies any symptoms  Per Onalee Upsala Pt is to [employer] for ten days day one being the day of exposure,  and providing he is sx free after 10 days he would be clear or we can test at day five if pt would like  Pt states he will just quarentine the ten days but will call if sx's develop

## 2021-03-09 ENCOUNTER — TELEMEDICINE (OUTPATIENT)
Dept: FAMILY MEDICINE CLINIC | Facility: CLINIC | Age: 56
End: 2021-03-09
Payer: COMMERCIAL

## 2021-03-09 VITALS — BODY MASS INDEX: 25.33 KG/M2 | TEMPERATURE: 98.5 F | HEIGHT: 65 IN | WEIGHT: 152 LBS

## 2021-03-09 DIAGNOSIS — K21.9 LARYNGOPHARYNGEAL REFLUX (LPR): Primary | ICD-10-CM

## 2021-03-09 DIAGNOSIS — J45.20 MILD INTERMITTENT ASTHMA WITHOUT COMPLICATION: ICD-10-CM

## 2021-03-09 DIAGNOSIS — J01.10 ACUTE NON-RECURRENT FRONTAL SINUSITIS: ICD-10-CM

## 2021-03-09 PROBLEM — J01.30 ACUTE NON-RECURRENT SPHENOIDAL SINUSITIS: Status: RESOLVED | Noted: 2019-03-27 | Resolved: 2021-03-09

## 2021-03-09 LAB — SARS-COV-2 RNA RESP QL NAA+PROBE: NEGATIVE

## 2021-03-09 PROCEDURE — U0003 INFECTIOUS AGENT DETECTION BY NUCLEIC ACID (DNA OR RNA); SEVERE ACUTE RESPIRATORY SYNDROME CORONAVIRUS 2 (SARS-COV-2) (CORONAVIRUS DISEASE [COVID-19]), AMPLIFIED PROBE TECHNIQUE, MAKING USE OF HIGH THROUGHPUT TECHNOLOGIES AS DESCRIBED BY CMS-2020-01-R: HCPCS | Performed by: FAMILY MEDICINE

## 2021-03-09 PROCEDURE — 3725F SCREEN DEPRESSION PERFORMED: CPT | Performed by: FAMILY MEDICINE

## 2021-03-09 PROCEDURE — 1036F TOBACCO NON-USER: CPT | Performed by: FAMILY MEDICINE

## 2021-03-09 PROCEDURE — U0005 INFEC AGEN DETEC AMPLI PROBE: HCPCS | Performed by: FAMILY MEDICINE

## 2021-03-09 PROCEDURE — 99213 OFFICE O/P EST LOW 20 MIN: CPT | Performed by: FAMILY MEDICINE

## 2021-03-09 PROCEDURE — 3008F BODY MASS INDEX DOCD: CPT | Performed by: FAMILY MEDICINE

## 2021-03-09 RX ORDER — AZITHROMYCIN 250 MG/1
TABLET, FILM COATED ORAL
Qty: 6 TABLET | Refills: 0 | Status: SHIPPED | OUTPATIENT
Start: 2021-03-09 | End: 2021-03-14

## 2021-03-09 NOTE — ASSESSMENT & PLAN NOTE
Patient does have LPR  Has been following with ENT before  He wondered about starting coffee  He can trial decaf and see how he does  Would start off with just 1 cup to see how he does, as there is some minor amounts of caffeine and that seemed to bother the LPR significantly before  I would recommend that he do that when he is not at work

## 2021-03-09 NOTE — PATIENT INSTRUCTIONS
Problem List Items Addressed This Visit     Asthma     Stable  Follow-up in the future  Laryngopharyngeal reflux (LPR) - Primary     Patient does have LPR  Has been following with ENT before  He wondered about starting coffee  He can trial decaf and see how he does  Would start off with just 1 cup to see how he does, as there is some minor amounts of caffeine and that seemed to bother the LPR significantly before  I would recommend that he do that when he is not at work  Other Visit Diagnoses     Acute non-recurrent frontal sinusitis        Acute sinus  Recommend Zithromax  If no improvement after 4-5 days on antibiotics, continue antibiotics and check COVID test   Will order now so that he can g    Relevant Medications    azithromycin (ZITHROMAX) 250 mg tablet    Other Relevant Orders    Novel Coronavirus (Covid-19),PCR [facility] - Collected at [facility] or [facility]          COVID 19 Instructions    Renay Cesar was advised to limit contact with others to essential tasks such as getting food, medications, and medical care  Proper handwashing reviewed, and Hand sanitzer when washing is not available  If the patient develops symptoms of COVID 19, the patient should call the office as soon as possible  For [id number] Flu season, it is strongly recommended that Flu Vaccinations be obtained  Please try to download [employer] Duo  Once you do download this on your phone, you will be prompted to add your phone number to the account  After that, he should receive a text from [employer], and use that code to verify your phone number  After that, you should be able to use [employer] Duo to receive and make video calls  Please download Microsoft Teams to your phone or computer  We will be transitioning to this platform for Video Visits  Instructions for downloading this are available from the office      We are committed to getting you vaccinated as soon as possible and will be closely following CDC and [facility]  guidelines as they are released and revised  Please refer to our COVID-19 vaccine webpage for the most up to date information on the vaccine and our distribution efforts  Jonah CHAVIRA COVID TESTING SITES FOR SURGICAL/PROCEDURAL PATIENTS     [facility] Location  Address  Hours   Monday-Friday Saturday Hours    [employer]  [address] [place], [place] [address]  8am-5pm  CLOSED    [place]   Reopening on 2/25  [address] [place] [id number]  8am-5pm  8am-1pm      Please note: [employer], [employer] are closed as of [id number]

## 2021-03-09 NOTE — PROGRESS NOTES
Virtual Regular Visit      Assessment/Plan:    Problem List Items Addressed This Visit     Asthma     Stable  Follow-up in the future  Laryngopharyngeal reflux (LPR) - Primary     Patient does have LPR  Has been following with ENT before  He wondered about starting coffee  He can trial decaf and see how he does  Would start off with just 1 cup to see how he does, as there is some minor amounts of caffeine and that seemed to bother the LPR significantly before  I would recommend that he do that when he is not at work  Other Visit Diagnoses     Acute non-recurrent frontal sinusitis        Acute sinus  Recommend Zithromax  If no improvement after 4-5 days on antibiotics, continue antibiotics and check COVID test   Will order now so that he can g    Relevant Medications    azithromycin (ZITHROMAX) 250 mg tablet    Other Relevant Orders    Novel Coronavirus (Covid-19),PCR [facility] - Collected at [facility] or [facility]               Reason for visit is   Chief Complaint   Patient presents with    Sore Throat     ongoing since yesterday     Headache    Nasal Congestion    Fatigue    Virtual Regular Visit        Encounter provider Lucio Garcia MD    Provider located at [address] [place] [address]  [address]      Recent Visits  No visits were found meeting these conditions  Showing recent visits within past 7 days and meeting all other requirements     Today's Visits  Date Type Provider Dept   03/09/21 Telemedicine Lucio Garcia MD [facility]   Showing today's visits and meeting all other requirements     Future Appointments  No visits were found meeting these conditions  Showing future appointments within next 150 days and meeting all other requirements        The patient was identified by name and date of birth   Jose Manuel Mckeon was informed that this is a telemedicine visit and that the visit is being conducted through [employer] and patient was informed that this is not a secure, HIPAA-compliant platform  He agrees to proceed     My office door was closed  No one else was in the room  He acknowledged consent and understanding of privacy and security of the video platform  The patient has agreed to participate and understands they can discontinue the visit at any time  Patient is aware this is a billable service  Subjective  Kelsea Hopson is a 63 y o  male   Chief Complaint   Patient presents with    Sore Throat     ongoing since yesterday     Headache    Nasal Congestion    Fatigue    Virtual Regular Visit        HA, ST, Post nasal drip  Pressure in face with looking down  No tooth pain  Has pain above eyes, and is fatigued  Has been for a couple days, worse last night  Breathing: Has some congestion in nose  No SOB  No wheezes  Not sure about COVID exposure  He reports he does wear a mask all the time  Past Medical History:   Diagnosis Date    Asthma        History reviewed  No pertinent surgical history  Current Outpatient Medications   Medication Sig Dispense Refill    lansoprazole (PREVACID) 30 mg capsule TAKE 1 CAPSULE (30 MG TOTAL) BY MOUTH DAILY FOR 90 DAYS 90 capsule 1    loratadine (CLARITIN) 10 mg tablet Take 10 mg by mouth 2 (two) times a day      montelukast (SINGULAIR) 10 mg tablet TAKE 1 TABLET BY MOUTH EVERYDAY AT BEDTIME 90 tablet 1    Multiple Vitamin (MULTIVITAMIN) capsule Take 1 capsule by mouth daily      albuterol (2 5 mg/3 mL) 0 083 % nebulizer solution Take 1 vial (2 5 mg total) by nebulization every 6 (six) hours as needed for wheezing or shortness of breath (Patient not taking: Reported on 3/9/2021) 24 vial 0    azithromycin (ZITHROMAX) 250 mg tablet Take 2 tablets on day 1, then 1 tablet daily days 2 through 5 6 tablet 0    fluticasone-vilanterol (BREO ELLIPTA) 200-25 MCG/INH inhaler Inhale 1 puff daily Rinse mouth after use   (Patient not taking: Reported on 3/9/2021) 1 Inhaler 11     No current facility-administered medications for this visit  No Known Allergies    Review of Systems   Constitutional: Negative  HENT: Positive for congestion, postnasal drip, rhinorrhea, sinus pressure, sinus pain and sore throat  Negative for dental problem  Respiratory: Negative  Cardiovascular: Negative  Video Exam    Vitals:    03/09/21 0854   Temp: 98 5 °F (36 9 °C)   TempSrc: Temporal   Weight: 68 9 kg (152 lb)   Height: 5' 4 5" (1 638 m)       Physical Exam  Nursing note reviewed  Constitutional:       General: He is not in acute distress  Appearance: He is well-developed  HENT:      Head: Normocephalic and atraumatic  Pulmonary:      Effort: Pulmonary effort is normal       Breath sounds: Normal breath sounds  I spent 15 minutes directly with the patient during this visit      VIRTUAL VISIT DISCLAIMER    Kelsea Hopson acknowledges that he has consented to an online visit or consultation  He understands that the online visit is based solely on information provided by him, and that, in the absence of a face-to-face physical evaluation by the physician, the diagnosis he receives is both limited and provisional in terms of accuracy and completeness  This is not intended to replace a full medical face-to-face evaluation by the physician  Kelsea Hopson understands and accepts these terms

## 2021-03-10 NOTE — RESULT ENCOUNTER NOTE
The COVID 19 test was negative  Patient should still practice Social Distancing  Should follow up with Virtual Visit to discuss with provider if there are any concerns

## 2021-03-30 DIAGNOSIS — Z23 ENCOUNTER FOR IMMUNIZATION: ICD-10-CM

## 2021-06-01 DIAGNOSIS — J30.89 NON-SEASONAL ALLERGIC RHINITIS, UNSPECIFIED TRIGGER: ICD-10-CM

## 2021-06-01 RX ORDER — MONTELUKAST SODIUM 10 MG/1
TABLET ORAL
Qty: 90 TABLET | Refills: 1 | Status: SHIPPED | OUTPATIENT
Start: 2021-06-01 | End: 2021-12-13

## 2021-11-11 ENCOUNTER — TELEMEDICINE (OUTPATIENT)
Dept: FAMILY MEDICINE CLINIC | Facility: CLINIC | Age: 56
End: 2021-11-11
Payer: COMMERCIAL

## 2021-11-11 DIAGNOSIS — K21.9 LARYNGOPHARYNGEAL REFLUX (LPR): ICD-10-CM

## 2021-11-11 DIAGNOSIS — Z12.11 COLON CANCER SCREENING: ICD-10-CM

## 2021-11-11 DIAGNOSIS — J45.20 MILD INTERMITTENT ASTHMA WITHOUT COMPLICATION: ICD-10-CM

## 2021-11-11 DIAGNOSIS — J01.10 ACUTE NON-RECURRENT FRONTAL SINUSITIS: Primary | ICD-10-CM

## 2021-11-11 DIAGNOSIS — B34.9 VIRAL INFECTION, UNSPECIFIED: ICD-10-CM

## 2021-11-11 PROCEDURE — 0241U HB NFCT DS VIR RESP RNA 4 TRGT: CPT | Performed by: FAMILY MEDICINE

## 2021-11-11 PROCEDURE — 99214 OFFICE O/P EST MOD 30 MIN: CPT | Performed by: FAMILY MEDICINE

## 2021-11-11 PROCEDURE — 1036F TOBACCO NON-USER: CPT | Performed by: FAMILY MEDICINE

## 2021-11-11 RX ORDER — ALBUTEROL SULFATE 90 UG/1
2 AEROSOL, METERED RESPIRATORY (INHALATION) EVERY 6 HOURS PRN
Qty: 6.7 G | Refills: 5 | Status: SHIPPED | OUTPATIENT
Start: 2021-11-11

## 2021-11-11 RX ORDER — AZITHROMYCIN 250 MG/1
TABLET, FILM COATED ORAL
Qty: 6 TABLET | Refills: 0 | Status: SHIPPED | OUTPATIENT
Start: 2021-11-11 | End: 2021-11-16

## 2021-11-14 ENCOUNTER — NURSE TRIAGE (OUTPATIENT)
Dept: OTHER | Facility: OTHER | Age: 56
End: 2021-11-14

## 2021-11-14 DIAGNOSIS — J45.21 MILD INTERMITTENT ASTHMA WITH ACUTE EXACERBATION: Primary | ICD-10-CM

## 2021-11-14 RX ORDER — PREDNISONE 10 MG/1
TABLET ORAL
Qty: 50 TABLET | Refills: 0 | Status: SHIPPED | OUTPATIENT
Start: 2021-11-14 | End: 2022-06-02

## 2021-12-11 DIAGNOSIS — J30.89 NON-SEASONAL ALLERGIC RHINITIS, UNSPECIFIED TRIGGER: ICD-10-CM

## 2021-12-13 RX ORDER — MONTELUKAST SODIUM 10 MG/1
TABLET ORAL
Qty: 90 TABLET | Refills: 1 | Status: SHIPPED | OUTPATIENT
Start: 2021-12-13 | End: 2022-05-26 | Stop reason: SDUPTHER

## 2022-01-07 ENCOUNTER — TELEMEDICINE (OUTPATIENT)
Dept: FAMILY MEDICINE CLINIC | Facility: CLINIC | Age: 57
End: 2022-01-07
Payer: COMMERCIAL

## 2022-01-07 VITALS — BODY MASS INDEX: 26.7 KG/M2 | TEMPERATURE: 98.6 F | WEIGHT: 158 LBS

## 2022-01-07 DIAGNOSIS — J45.20 MILD INTERMITTENT ASTHMA WITHOUT COMPLICATION: ICD-10-CM

## 2022-01-07 DIAGNOSIS — J01.40 ACUTE NON-RECURRENT PANSINUSITIS: ICD-10-CM

## 2022-01-07 DIAGNOSIS — B34.9 VIRAL INFECTION, UNSPECIFIED: Primary | ICD-10-CM

## 2022-01-07 PROCEDURE — 99213 OFFICE O/P EST LOW 20 MIN: CPT | Performed by: FAMILY MEDICINE

## 2022-01-07 PROCEDURE — 1036F TOBACCO NON-USER: CPT | Performed by: FAMILY MEDICINE

## 2022-01-07 PROCEDURE — U0003 INFECTIOUS AGENT DETECTION BY NUCLEIC ACID (DNA OR RNA); SEVERE ACUTE RESPIRATORY SYNDROME CORONAVIRUS 2 (SARS-COV-2) (CORONAVIRUS DISEASE [COVID-19]), AMPLIFIED PROBE TECHNIQUE, MAKING USE OF HIGH THROUGHPUT TECHNOLOGIES AS DESCRIBED BY CMS-2020-01-R: HCPCS | Performed by: FAMILY MEDICINE

## 2022-01-07 RX ORDER — AZITHROMYCIN 250 MG/1
TABLET, FILM COATED ORAL
Qty: 6 TABLET | Refills: 0 | Status: SHIPPED | OUTPATIENT
Start: 2022-01-07 | End: 2022-01-12

## 2022-01-07 NOTE — LETTER
Date: 1/7/2022    To whom it may concern: This is to certify that Ivette Bowen has been under my care for the following diagnosis: Asthma, Possible Viona Mabel  I feel that he is unable to serve on Jury Duty at this time for the above mentioned medical reasons                    Sincerely,   Fabiola Mckeon MD

## 2022-01-07 NOTE — PATIENT INSTRUCTIONS
COVID TESTING SITES    [facility] is offering drive through testing for COVID  These are the preferred testing sites  Wait times should be minimal     Starting Neville 3, 2022:    [facility]: Monday-Thursday 10-9, Friday 10-5, and Saturday 9-1    [address]: Monday-Thursday 10-9, Friday 10-5, and Saturday 9-1    [facility]: M-Th 3-8, Sat 9-1    [address] [facility] [address] [facility] [address]: M-F 3-7    The sites and times are subject to change  For the most Up to date locations and times, please visit this website:    [email or web address]      If you are not able to get to one of the drive through sites, you can go to [facility] if you have an order  There can be a long wait time for this service  When arriving at [facility]:  Call the number [facility], and they will instruct you as to what to do      [facility]  [address] [facility], [address]  [facility], [address] [facility]  7:30 am to 10:30 pm  Sat  - Sun  8 am to 8 pm    [address], [address]  [phone]  Mon  - Fri  7:30 am to 10:30 pm  Sat  - Sun  8 am to 8 pm    [address] [facility]  [address] [facility], [address]  [place], [address]  [address]  8 am to 8 pm  Sat  - Sun  8 am to 4 pm    [address]  [address] [facility], [facility]  [phone]  Mon  - Fri  8 am to 4 pm  Sat  - Sun  8 am to 4 pm    [address] [facility], [address] [facility]  [phone]  Mon  - Fri  8 am to 8 pm  Sat  - Sun  8 am to 4 pm    [address] [facility]  [address], [address]  [address] [facility]  8 am to 8 pm  Sat  - Sun  8 am to 4 pm    [address] [facility], [address] [place]  [phone]  Mon  - Fri  8 am to 4 pm    [address]  [address] [facility]  [address], [address]  [phone]  Mon  - Fri  8 am to 8 pm  Sat  - Sun  8 am to 8 pm    [address]  [address]  [facility], [address]  [phone]  Mon  - Fri  8 am to 8 pm    [facility]  [address] [place] [address], [address] [facility]  Mon -Fri  8 am to 8 pm  Sat - Sun  8 am to 4 pm    [address] [facility]  [address] [facility], [address]  [address] Fri  8 am to 8 pm  Sat  - Sun  8 am to 4 pm    [address]  [address], [address]  [phone]  Mon  - Fri  8 am to 4 pm    [address] [facility]  [address]  [facility], [address]  [phone]  Mon  - Fri  8 am to 8 pm  Sat  - Sun  8 am to 8 pm    [address] [place] [address] Now [facility] [place]  [address]  [place], [address]  [phone]  Mon  - Fri  8 am to 8 pm  Sat  - Sun  8 am to 8 pm    [address]   [address], [address], [address]  [phone]  Mon  - Fri  7:30 am to 10:30 pm  Sat  - Sun  8 am to 8 pm    [address]  [address] [place], [place], [address]  Mon -Fri  8 am to 6 pm    [address] [facility]  [address], [address]  [phone]  Mon  - Fri  8 am to 8 pm    [address]  [address]0 [facility] [place] [address]  [phone]  Mon  - Fri  8 am to 8 pm  Sat  - Sun  8 am to 4 pm    Problem List Items Addressed This Visit     Asthma     Stable  Currently doing well  I would not make any adjustments  Re-evaluate in the future  Other Visit Diagnoses     Viral infection, unspecified    -  Primary    Patient possibly has COVID-19  I would recommend checking for that  He has been vaccinated and boosted  Relevant Orders    COVID Only- Collected at [facility] or [facility]    Acute non-recurrent pansinusitis        Likely sinus infection  Zithromax  Follow-up in 2 weeks if needed  Relevant Medications    azithromycin (ZITHROMAX) 250 mg tablet          COVID 19 Instructions    Replaced by [facility] was advised to limit contact with others to essential tasks such as getting food, medications, and medical care  Proper handwashing reviewed, and Hand sanitzer when washing is not available  If the patient develops symptoms of COVID 19, the patient should call the office as soon as possible  For 3469-8489 Flu season, it is strongly recommended that Flu Vaccinations be obtained  Virtual Visits:  Roberto: This works on smart phones (any phone with Internet browsing capability)  You should get a text message when the provider is ready to see you  Click on the link in the text message, and the call should start  You will need to type in your name, and allow camera and microphone access  This is HIPPA compliant, and secure  If you have not already done so, get immunized to COVID 19  We are committed to getting you vaccinated as soon as possible and will be closely following CDC and [facility]  guidelines as they are released and revised  Please refer to our COVID-19 vaccine webpage for the most up to date information on the vaccine and our distribution efforts      This site will also have the most up to date recommendations for COVID booster larissa Mckenzie tn    Call [phone] ([phone]), option [phone]    OUR NEW LOCATION:    [address], [facility] [address], [place], [address]  Fax: [phone]    Lab services and OB/GYN are at this location as well

## 2022-01-07 NOTE — PROGRESS NOTES
Virtual Regular Visit    Verification of patient location:    Patient is located in the following state in which I hold an active license [place]      Assessment/Plan:    Problem List Items Addressed This Visit     Asthma     Stable  Currently doing well  I would not make any adjustments  Re-evaluate in the future  Other Visit Diagnoses     Viral infection, unspecified    -  Primary    Patient possibly has COVID-19  I would recommend checking for that  He has been vaccinated and boosted  Relevant Orders    COVID Only- Collected at [facility] or [facility]    Acute non-recurrent pansinusitis        Likely sinus infection  Zithromax  Follow-up in 2 weeks if needed  Relevant Medications    azithromycin (ZITHROMAX) 250 mg tablet          BMI Counseling: Body mass index is 26 7 kg/m²  The BMI is above normal  Nutrition recommendations include decreasing portion sizes, encouraging healthy choices of fruits and vegetables, decreasing fast food intake, consuming healthier snacks, limiting drinks that contain sugar, moderation in carbohydrate intake, increasing intake of lean protein, reducing intake of saturated and trans fat and reducing intake of cholesterol  Exercise recommendations include exercising 3-5 times per week  No pharmacotherapy was ordered  Rationale for BMI follow-up plan is due to patient being overweight or obese  Reason for visit is   Chief Complaint   Patient presents with    Facial Pain    Sore Throat     due to drainage x 3 days  mgb    Headache    Virtual Regular Visit        Encounter provider Ballard Bence, MD    Provider located at [address]  [address] [address] [address] [phone]      Recent Visits  No visits were found meeting these conditions    Showing recent visits within past 7 days and meeting all other requirements  Today's Visits  Date Type Provider Dept   01/07/22 Telemedicine Dary Miles Dillan Rae, [address] [place] [address] Primary Care   Showing today's visits and meeting all other requirements  Future Appointments  No visits were found meeting these conditions  Showing future appointments within next 150 days and meeting all other requirements       The patient was identified by name and date of birth  Nitza Killian was informed that this is a telemedicine visit and that the visit is being conducted through [facility] and patient was informed this is a secure, HIPAA-complaint platform  He agrees to proceed     My office door was closed  No one else was in the room  He acknowledged consent and understanding of privacy and security of the video platform  The patient has agreed to participate and understands they can discontinue the visit at any time  Patient is aware this is a billable service  Subjective  Nitza Killian is a 61 y o  male   Chief Complaint   Patient presents with    Facial Pain    Sore Throat     due to drainage x 3 days  mgb    Headache    Virtual Regular Visit        HA, nasal congestion  Post nasal drip  Has been for 3 days  Getting worse over this time  Meds so far: Nothing in particular  Vaccinated for COVID, and booster  Head pressure  Crackles in nose  He has had sinus infections like this before  Feels the same  Past Medical History:   Diagnosis Date    Asthma        History reviewed  No pertinent surgical history      Current Outpatient Medications   Medication Sig Dispense Refill    albuterol (Proventil HFA) 90 mcg/act inhaler Inhale 2 puffs every 6 (six) hours as needed for wheezing 6 7 g 5    lansoprazole (PREVACID) 30 mg capsule TAKE 1 CAPSULE (30 MG TOTAL) BY MOUTH DAILY FOR 90 DAYS 90 capsule 1    loratadine (CLARITIN) 10 mg tablet Take 10 mg by mouth 2 (two) times a day      montelukast (SINGULAIR) 10 mg tablet TAKE 1 TABLET BY MOUTH EVERYDAY AT BEDTIME 90 tablet 1    Multiple Vitamin (MULTIVITAMIN) capsule Take 1 capsule by mouth daily      albuterol (2 5 mg/3 mL) 0 083 % nebulizer solution Take 1 vial (2 5 mg total) by nebulization every 6 (six) hours as needed for wheezing or shortness of breath (Patient not taking: Reported on 3/9/2021) 24 vial 0    azithromycin (ZITHROMAX) 250 mg tablet Take 2 tablets on day 1, then 1 tablet daily days 2 through 5 6 tablet 0    fluticasone-vilanterol (BREO ELLIPTA) 200-25 MCG/INH inhaler Inhale 1 puff daily Rinse mouth after use  (Patient not taking: Reported on 3/9/2021) 1 Inhaler 11    predniSONE 10 mg tablet Take 3 BID for 3 days then 2 BID for 3 days then 1 BID for 3 days then 1 QD for 3 days then 1/2 QD for 3 days then stop (Patient not taking: Reported on 1/7/2022 ) 50 tablet 0     No current facility-administered medications for this visit  No Known Allergies    Review of Systems   Constitutional: Negative  HENT: Positive for congestion, postnasal drip, sinus pressure, sinus pain and sore throat  Negative for dental problem  Eyes: Negative  Respiratory: Negative  Negative for shortness of breath  Cardiovascular: Negative  Gastrointestinal: Negative  Endocrine: Negative  Genitourinary: Negative  Musculoskeletal: Negative  Skin: Negative  Allergic/Immunologic: Negative  Neurological: Negative  Hematological: Negative  Psychiatric/Behavioral: Negative  Video Exam    Vitals:    01/07/22 1333   Temp: 98 6 °F (37 °C)   TempSrc: Oral   Weight: 71 7 kg (158 lb)       Physical Exam  Nursing note reviewed  Constitutional:       General: He is not in acute distress  Appearance: He is well-developed  HENT:      Head: Normocephalic and atraumatic  Pulmonary:      Effort: Pulmonary effort is normal       Breath sounds: Normal breath sounds  I spent 20 minutes directly with the patient during this visit    VIRTUAL VISIT [address] verbally agrees to participate in [employer]   Pt is aware that Virtual Care Services could be limited without vital signs or the ability to perform a full hands-on physical Ross Camara understands he or the provider may request at any time to terminate the video visit and request the patient to seek care or treatment in person

## 2022-05-25 ENCOUNTER — TELEPHONE (OUTPATIENT)
Dept: FAMILY MEDICINE CLINIC | Facility: CLINIC | Age: 57
End: 2022-05-25

## 2022-05-25 NOTE — TELEPHONE ENCOUNTER
Pt's wife Kai Cummingsy is calling to see if we would send in a week supply of Singular to the [employer] on file  Pt will be seeing Dr Jarret Vasquez on 6/2 for his allergy symptoms  Pt ONLY wants to see Dr Jarret Vasquez and this was his first available  Please call Kai Davila with Dr Tom Leslie response, thank you!

## 2022-05-26 DIAGNOSIS — J30.89 NON-SEASONAL ALLERGIC RHINITIS, UNSPECIFIED TRIGGER: ICD-10-CM

## 2022-05-26 RX ORDER — MONTELUKAST SODIUM 10 MG/1
10 TABLET ORAL
Qty: 90 TABLET | Refills: 1 | Status: SHIPPED | OUTPATIENT
Start: 2022-05-26 | End: 2022-06-02 | Stop reason: SDUPTHER

## 2022-06-02 ENCOUNTER — TELEMEDICINE (OUTPATIENT)
Dept: FAMILY MEDICINE CLINIC | Facility: CLINIC | Age: 57
End: 2022-06-02
Payer: COMMERCIAL

## 2022-06-02 VITALS — TEMPERATURE: 99 F | WEIGHT: 152 LBS | BODY MASS INDEX: 25.69 KG/M2

## 2022-06-02 DIAGNOSIS — J45.20 MILD INTERMITTENT ASTHMA WITHOUT COMPLICATION: ICD-10-CM

## 2022-06-02 DIAGNOSIS — J01.10 ACUTE NON-RECURRENT FRONTAL SINUSITIS: Primary | ICD-10-CM

## 2022-06-02 DIAGNOSIS — K21.9 LARYNGOPHARYNGEAL REFLUX (LPR): ICD-10-CM

## 2022-06-02 DIAGNOSIS — J30.89 NON-SEASONAL ALLERGIC RHINITIS, UNSPECIFIED TRIGGER: ICD-10-CM

## 2022-06-02 PROCEDURE — 99214 OFFICE O/P EST MOD 30 MIN: CPT | Performed by: FAMILY MEDICINE

## 2022-06-02 RX ORDER — AMOXICILLIN 875 MG/1
875 TABLET, COATED ORAL 2 TIMES DAILY
Qty: 20 TABLET | Refills: 0 | Status: SHIPPED | OUTPATIENT
Start: 2022-06-02 | End: 2022-06-12

## 2022-06-02 RX ORDER — MONTELUKAST SODIUM 10 MG/1
10 TABLET ORAL
Qty: 90 TABLET | Refills: 1 | Status: SHIPPED | OUTPATIENT
Start: 2022-06-02

## 2022-06-02 NOTE — ASSESSMENT & PLAN NOTE
Patient is on lansoprazole for LPR  Has been doing relatively well  He did manage to decrease his caffeine intake a bit, which then improved the symptoms

## 2022-06-02 NOTE — PROGRESS NOTES
Virtual Regular Visit    Verification of patient location:    Patient is located in the following state in which I hold an active license [place]      Assessment/Plan:    Problem List Items Addressed This Visit     Allergic rhinitis     Currently on Singulair  No change  Seems to be working relatively well  Relevant Medications    montelukast (SINGULAIR) 10 mg tablet    Asthma     Patient does have a history of asthma, but his asthma is been doing quite well lately  He does use Breo on occasion, but has not needed to use it regularly  He does continue to use Singulair 10 mg  Also p r n  albuterol  Relevant Medications    montelukast (SINGULAIR) 10 mg tablet    Laryngopharyngeal reflux (LPR)     Patient is on lansoprazole for LPR  Has been doing relatively well  He did manage to decrease his caffeine intake a bit, which then improved the symptoms  Other Visit Diagnoses     Acute non-recurrent frontal sinusitis    -  Primary    Patient likely does have acute sinus  Amoxicillin  Check COVID test at home through rapid  Relevant Medications    amoxicillin (AMOXIL) 875 mg tablet            Depression Screening and Follow-up Plan: Patient was screened for depression during today's encounter  They screened negative with a PHQ-2 score of 0  Reason for visit is   Chief Complaint   Patient presents with    Sore Throat    Headache    Cold Like Symptoms    Virtual Regular Visit        Encounter provider Venita Mayer MD    Provider located at [address] [facility]  [address] [facility] [address] [facility] [address] [place] [address] [phone]      Recent Visits  No visits were found meeting these conditions    Showing recent visits within past 7 days and meeting all other requirements  Today's Visits  Date Type Provider Dept   06/02/22 Telemedicine Venita Mayer MD [facility] Primary Care   Showing today's visits and meeting all other requirements  Future Appointments  No visits were found meeting these conditions  Showing future appointments within next 150 days and meeting all other requirements       The patient was identified by name and date of birth  Delbert Fam was informed that this is a telemedicine visit and that the visit is being conducted through [address] and patient was informed this is a secure, HIPAA-complaint platform  He agrees to proceed     My office door was closed  No one else was in the room  He acknowledged consent and understanding of privacy and security of the video platform  The patient has agreed to participate and understands they can discontinue the visit at any time  Patient is aware this is a billable service  Subjective  Delbert Fam is a 61 y o  male   Chief Complaint   Patient presents with    Sore Throat    Headache    Cold Like Symptoms    Virtual Regular Visit        Yesterday he was having increased ST, HA with this, fever, PNgtt  No SOB  Started yesterday, ie got worse  Wife and daughter also ill  Temp was 99f  No NVD  No smell or taste changes  Some facial pressure, ayo above eyes  Looking down increased pressure in face  LPR:  Patient is currently on lansoprazole for LPR  Reports he has been doing quite well with this  Asthma:  Has not really been having any problems with his asthma  Does need to use the [facility], but not all the time  ARTURO:  Currently on Singulair  Was working outside quite a bit yesterday  Allergy symptoms as above  Past Medical History:   Diagnosis Date    Asthma        History reviewed  No pertinent surgical history      Current Outpatient Medications   Medication Sig Dispense Refill    albuterol (2 5 mg/3 mL) 0 083 % nebulizer solution Take 1 vial (2 5 mg total) by nebulization every 6 (six) hours as needed for wheezing or shortness of breath 24 vial 0    albuterol (Proventil HFA) 90 mcg/act inhaler Inhale 2 puffs every 6 (six) hours as needed for wheezing 6 7 g 5    amoxicillin (AMOXIL) 875 mg tablet Take 1 tablet (875 mg total) by mouth 2 (two) times a day for 10 days 20 tablet 0    fluticasone-vilanterol (BREO ELLIPTA) 200-25 MCG/INH inhaler Inhale 1 puff daily Rinse mouth after use  1 Inhaler 11    lansoprazole (PREVACID) 30 mg capsule TAKE 1 CAPSULE (30 MG TOTAL) BY MOUTH DAILY FOR 90 DAYS 90 capsule 1    loratadine (CLARITIN) 10 mg tablet Take 10 mg by mouth 2 (two) times a day      montelukast (SINGULAIR) 10 mg tablet Take 1 tablet (10 mg total) by mouth daily at bedtime 90 tablet 1    Multiple Vitamin (MULTIVITAMIN) capsule Take 1 capsule by mouth daily       No current facility-administered medications for this visit  No Known Allergies    Review of Systems   Constitutional: Positive for activity change and fever  HENT: Positive for congestion, postnasal drip, sinus pressure, sinus pain, sore throat and trouble swallowing  Negative for dental problem  Eyes: Negative  Respiratory: Negative for shortness of breath and wheezing  Cardiovascular: Negative  Gastrointestinal: Negative  Musculoskeletal: Negative  All other systems reviewed and are negative  Video Exam    Vitals:    06/02/22 0802   Temp: 99 °F (37 2 °C)   TempSrc: Oral   Weight: 68 9 kg (152 lb)       Physical Exam  Nursing note reviewed  Constitutional:       General: He is not in acute distress  Appearance: He is well-developed  HENT:      Head: Normocephalic and atraumatic  Pulmonary:      Effort: Pulmonary effort is normal       Breath sounds: Normal breath sounds  I spent 20 minutes directly with the patient during this visit    VIRTUAL VISIT [address] verbally agrees to participate in [employer]   Pt is aware that [employer] could be limited without vital signs or the ability to perform a full hands-on physical Deacon Carlson understands he or the provider may request at any time to terminate the video visit and request the patient to seek care or treatment in person

## 2022-06-02 NOTE — PATIENT INSTRUCTIONS
Problem List Items Addressed This Visit       Allergic rhinitis     Currently on Singulair  No change  Seems to be working relatively well  Relevant Medications    montelukast (SINGULAIR) 10 mg tablet    Asthma     Patient does have a history of asthma, but his asthma is been doing quite well lately  He does use Breo on occasion, but has not needed to use it regularly  He does continue to use Singulair 10 mg  Also p r n  albuterol  Relevant Medications    montelukast (SINGULAIR) 10 mg tablet    Laryngopharyngeal reflux (LPR)     Patient is on lansoprazole for LPR  Has been doing relatively well  He did manage to decrease his caffeine intake a bit, which then improved the symptoms  Other Visit Diagnoses       Acute non-recurrent frontal sinusitis    -  Primary    Patient likely does have acute sinus  Amoxicillin  Check COVID test at home through rapid  Relevant Medications    amoxicillin (AMOXIL) 875 mg tablet            COVID 19 Instructions    Lewis Cortez was advised to limit contact with others to essential tasks such as getting food, medications, and medical care  Proper handwashing reviewed, and Hand sanitzer when washing is not available  If the patient develops symptoms of COVID 19, the patient should call the office as soon as possible  For 2153-4246 Flu season, it is strongly recommended that Flu Vaccinations be obtained  Virtual Visits:  Roberto: This works on smart phones (any phone with Internet browsing capability)  You should get a text message when the provider is ready to see you  Click on the link in the text message, and the call should start  You will need to type in your name, and allow camera and microphone access  This is HIPPA compliant, and secure  If you have not already done so, get immunized to COVID 19        We are committed to getting you vaccinated as soon as possible and will be closely following CDC and [facility]  guidelines as they are released and revised  Please refer to our COVID-19 vaccine webpage for the most up to date information on the vaccine and our distribution efforts  This site will also have the most up to date recommendations for COVID booster vaccine  [employer]ah [employer]    Call [phone] ([phone] option [phone]    OUR NEW LOCATION:    [address], [facility] [address], [place], [address]  Fax: [phone]    Lab services and OB/GYN are at this location as well

## 2022-06-02 NOTE — ASSESSMENT & PLAN NOTE
Patient does have a history of asthma, but his asthma is been doing quite well lately  He does use Breo on occasion, but has not needed to use it regularly  He does continue to use Singulair 10 mg  Also p r n  albuterol

## 2023-02-26 ENCOUNTER — TELEPHONE (OUTPATIENT)
Dept: OTHER | Facility: OTHER | Age: 58
End: 2023-02-26

## 2023-02-27 NOTE — TELEPHONE ENCOUNTER
Patient is calling regarding cancelling an appointment      Date/Time: 02/27 @8a     Patient was rescheduled: YES [] NO [x]    Patient requesting call back to reschedule: YES [] NO [x]

## 2023-08-18 ENCOUNTER — OFFICE VISIT (OUTPATIENT)
Dept: FAMILY MEDICINE CLINIC | Facility: CLINIC | Age: 58
End: 2023-08-18
Payer: COMMERCIAL

## 2023-08-18 VITALS
WEIGHT: 141 LBS | DIASTOLIC BLOOD PRESSURE: 76 MMHG | OXYGEN SATURATION: 96 % | BODY MASS INDEX: 24.98 KG/M2 | TEMPERATURE: 97.8 F | HEART RATE: 68 BPM | SYSTOLIC BLOOD PRESSURE: 128 MMHG

## 2023-08-18 DIAGNOSIS — J45.30 MILD PERSISTENT ASTHMA WITHOUT COMPLICATION: ICD-10-CM

## 2023-08-18 DIAGNOSIS — Z12.11 SCREEN FOR COLON CANCER: ICD-10-CM

## 2023-08-18 DIAGNOSIS — M25.561 ACUTE PAIN OF RIGHT KNEE: Primary | ICD-10-CM

## 2023-08-18 DIAGNOSIS — K21.9 LARYNGOPHARYNGEAL REFLUX (LPR): ICD-10-CM

## 2023-08-18 DIAGNOSIS — N64.9 LESION OF RIGHT NIPPLE: ICD-10-CM

## 2023-08-18 PROBLEM — M25.569 KNEE PAIN: Status: ACTIVE | Noted: 2023-08-18

## 2023-08-18 PROCEDURE — 99214 OFFICE O/P EST MOD 30 MIN: CPT | Performed by: FAMILY MEDICINE

## 2023-08-18 NOTE — PATIENT INSTRUCTIONS
1. Acute pain of right knee  Assessment & Plan:  Irritation and discomfort in the right knee. Question if this is meniscus versus tendon. No laxity on exam, no effusion. Check x-ray right knee, Lyme titer. Follow-up in the future. 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 Orders:  -     Lyme Total AB W Reflex to IGM/IGG; Future  -     XR knee 3 vw right non injury; Future; Expected date: 08/18/2023    2. Mild persistent asthma without complication  Assessment & Plan:  Using as needed albuterol, not needing Advair lately. Finish prednisone previously, and has not been taking it. 3. Lesion of right nipple  Assessment & Plan:  Irritation at the right nipple, 2 o'clock position. Recommend follow-up with plastics for consideration of removal.  Clinical photograph taken today. Orders:  -     Ambulatory Referral to Plastic Surgery; Future  -     Ambulatory Referral to Dermatology; Future    4. Laryngopharyngeal reflux (LPR)  Assessment & Plan:  Continue on lansoprazole. No change. 5. Screen for colon cancer  -     Cologuard        COVID 19 Instructions    Dae Albright was advised to limit contact with others to essential tasks such as getting food, medications, and medical care. Proper handwashing reviewed, and Hand sanitzer when washing is not available. If the patient develops symptoms of COVID 19, the patient should call the office as soon as possible. For 5795-4414 Flu season, it is strongly recommended that Flu Vaccinations be obtained. Virtual Visits:  Roberto: This works on smart phones (any phone with Internet browsing capability). You should get a text message when the provider is ready to see you. Click on the link in the text message, and the call should start. You will need to type in your name, and allow camera and microphone access. This is HIPPA compliant, and secure.       If you have not already done so, get immunized to COVID 19. We are committed to getting you vaccinated as soon as possible and will be closely following CDC and [facility] guidelines as they are released and revised. Please refer to our COVID-19 vaccine webpage for the most up to date information on the vaccine and our distribution efforts. This site will also have the most up to date recommendations for COVID booster vaccine. [email or web address]    Call [phone] ([phone]), option 7    OUR NEW LOCATION:    [facility]  [address], [address] [facility]  Fax: [phone]    Lab services and OB/GYN are at this location as well.

## 2023-08-18 NOTE — ASSESSMENT & PLAN NOTE
Irritation and discomfort in the right knee. Question if this is meniscus versus tendon. No laxity on exam, no effusion. Check x-ray right knee, Lyme titer. Follow-up in the future. 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

## 2023-08-18 NOTE — PROGRESS NOTES
Name: Rosy Deluna      : 1965      MRN: [id number]  Encounter Provider: Callie Starr MD  Encounter Date: 2023   Encounter department: [facility] PRIMARY CARE    Assessment & Plan     1. Acute pain of right knee  Assessment & Plan:  Irritation and discomfort in the right knee. Question if this is meniscus versus tendon. No laxity on exam, no effusion. Check x-ray right knee, Lyme titer. Follow-up in the future. 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 Orders:  -     Lyme Total AB W Reflex to IGM/IGG; Future  -     XR knee 3 vw right non injury; Future; Expected date: 2023    2. Mild persistent asthma without complication  Assessment & Plan:  Using as needed albuterol, not needing Advair lately. Finish prednisone previously, and has not been taking it. 3. Lesion of right nipple  Assessment & Plan:  Irritation at the right nipple, 2 o'clock position. Recommend follow-up with plastics for consideration of removal.  Clinical photograph taken today. Orders:  -     Ambulatory Referral to Plastic Surgery; Future  -     Ambulatory Referral to Dermatology; Future    4. Laryngopharyngeal reflux (LPR)  Assessment & Plan:  Continue on lansoprazole. No change. 5. Screen for colon cancer  -     Cologuard           Subjective      Chief Complaint   Patient presents with   • Knee Pain     Pt states he has had right knee pain x 1 week, no known injury. mgb       Please see chief complaint. Does work in [employer], and is on knees quite a bit in past.    Knee has not locked. Has not given out, but it does not feel normal.  Feels as if it might lock up, but never quite does. With rest, it seems to be worse. When he is moving, does not seem to bother him as much. He also has a small lesion on the right side of the chest.  Thought perhaps it was a bug bite. Present for a bit now.   Wondered if now it was infection. Started as itch. Present in few days. Asthma: Patient reports he is doing relatively well. Has not really had severe episodes lately. Reviewed about medication use. LPR: Has been treated in the past for this. Review of Systems   Constitutional: Negative. HENT: Negative. Eyes: Negative. Respiratory: Negative. Cardiovascular: Negative. Gastrointestinal: Negative. Endocrine: Negative. Genitourinary: Negative. Musculoskeletal:        Per HPI   Skin:        Lesion per HPI   Allergic/Immunologic: Negative. Neurological: Negative. Hematological: Negative. Psychiatric/Behavioral: Negative. Current Outpatient Medications on File Prior to Visit   Medication Sig   • albuterol (2.5 mg/3 mL) 0.083 % nebulizer solution Take 1 vial (2.5 mg total) by nebulization every 6 (six) hours as needed for wheezing or shortness of breath   • albuterol (Proventil HFA) 90 mcg/act inhaler Inhale 2 puffs every 6 (six) hours as needed for wheezing   • lansoprazole (PREVACID) 30 mg capsule TAKE 1 CAPSULE (30 MG TOTAL) BY MOUTH DAILY FOR 90 DAYS   • loratadine (CLARITIN) 10 mg tablet Take 10 mg by mouth 2 (two) times a day   • montelukast (SINGULAIR) 10 mg tablet TAKE 1 TABLET BY MOUTH ONCE DAILY AT BEDTIME   • Multiple Vitamin (MULTIVITAMIN) capsule Take 1 capsule by mouth daily   • Fluticasone-Salmeterol (Advair) 100-50 mcg/dose inhaler INHALE 1 DOSE BY MOUTH TWICE DAILY (Patient not taking: Reported on 8/18/2023)   • [DISCONTINUED] predniSONE 10 mg tablet Take 3 BID for 3 days then 2 BID for 3 days then 1 BID for 3 days then 1 QD for 3 days then 1/2 QD for 3 days then stop (Patient not taking: Reported on 8/18/2023)       Objective     /76 (BP Location: Left arm, Patient Position: Sitting, Cuff Size: Standard)   Pulse 68   Temp 97.8 °F (36.6 °C) (Temporal)   Wt 64 kg (141 lb)   SpO2 96%   BMI 24.98 kg/m²     Physical Exam  Vitals and nursing note reviewed. Constitutional:       Appearance: Normal appearance. Musculoskeletal:      Right knee: No bony tenderness. Normal range of motion. No tenderness. Normal patellar mobility. Left knee: Normal.   Neurological:      Mental Status: He is alert.        Charity Barajas MD

## 2023-08-18 NOTE — ASSESSMENT & PLAN NOTE
Irritation at the right nipple, 2 o'clock position. Recommend follow-up with plastics for consideration of removal.  Clinical photograph taken today.

## 2023-08-18 NOTE — ASSESSMENT & PLAN NOTE
Using as needed albuterol, not needing Advair lately. Finish prednisone previously, and has not been taking it.

## 2023-09-02 ENCOUNTER — APPOINTMENT (OUTPATIENT)
Dept: LAB | Facility: MEDICAL CENTER | Age: 58
End: 2023-09-02
Payer: COMMERCIAL

## 2023-09-02 DIAGNOSIS — M25.561 ACUTE PAIN OF RIGHT KNEE: ICD-10-CM

## 2023-09-02 PROCEDURE — 36415 COLL VENOUS BLD VENIPUNCTURE: CPT

## 2023-09-02 PROCEDURE — 86618 LYME DISEASE ANTIBODY: CPT

## 2023-09-03 LAB — B BURGDOR IGG+IGM SER QL IA: NEGATIVE

## 2023-10-04 ENCOUNTER — OFFICE VISIT (OUTPATIENT)
Dept: FAMILY MEDICINE CLINIC | Facility: CLINIC | Age: 58
End: 2023-10-04
Payer: COMMERCIAL

## 2023-10-04 VITALS
WEIGHT: 135 LBS | HEART RATE: 110 BPM | OXYGEN SATURATION: 97 % | DIASTOLIC BLOOD PRESSURE: 78 MMHG | HEIGHT: 63 IN | BODY MASS INDEX: 23.92 KG/M2 | SYSTOLIC BLOOD PRESSURE: 142 MMHG | RESPIRATION RATE: 16 BRPM

## 2023-10-04 DIAGNOSIS — J45.30 MILD PERSISTENT ASTHMA WITHOUT COMPLICATION: ICD-10-CM

## 2023-10-04 DIAGNOSIS — Z13.220 LIPID SCREENING: ICD-10-CM

## 2023-10-04 DIAGNOSIS — R63.4 WEIGHT LOSS, UNINTENTIONAL: Primary | ICD-10-CM

## 2023-10-04 DIAGNOSIS — R25.1 TREMOR: ICD-10-CM

## 2023-10-04 DIAGNOSIS — N64.9 LESION OF RIGHT NIPPLE: ICD-10-CM

## 2023-10-04 DIAGNOSIS — M25.561 ACUTE PAIN OF RIGHT KNEE: ICD-10-CM

## 2023-10-04 PROBLEM — M25.569 KNEE PAIN: Status: RESOLVED | Noted: 2023-08-18 | Resolved: 2023-10-04

## 2023-10-04 PROCEDURE — 99214 OFFICE O/P EST MOD 30 MIN: CPT | Performed by: FAMILY MEDICINE

## 2023-10-04 NOTE — ASSESSMENT & PLAN NOTE
Patient reports no changes. I did remind him that I recommended plastic surgery for removal previously.

## 2023-10-04 NOTE — PROGRESS NOTES
Name: Terrence Mcclure      : 1965      MRN: [id number]  Encounter Provider: Sherin Presley MD  Encounter Date: 10/4/2023   Encounter department: [facility] PRIMARY CARE    Assessment & Plan     1. Weight loss, unintentional  Assessment & Plan:  Patient has had a significant amount of weight loss lately. His wife mentions something about him having some anorexia. Would recommend check laboratory studies, and then follow-up after that. Clearly this weight loss amount in short order is not normal.  I would recommend follow-up afterwards. Orders:  -     TSH, 3rd generation; Future; Expected date: 10/04/2023  -     CBC and differential; Future  -     Comprehensive metabolic panel; Future; Expected date: 10/04/2023    2. Tremor  Assessment & Plan:  Patient does have tremors that are new. Check labs. Follow-up afterwards. Orders:  -     TSH, 3rd generation; Future; Expected date: 10/04/2023  -     CBC and differential; Future  -     Comprehensive metabolic panel; Future; Expected date: 10/04/2023  -     Magnesium; Future  -     Phosphorus; Future    3. Lesion of right nipple  Assessment & Plan:  Patient reports no changes. I did remind him that I recommended plastic surgery for removal previously. 4. Acute pain of right knee  Assessment & Plan:  Patient reports the knee pain is now resolved. We will follow in the future as needed. 5. Mild persistent asthma without complication  Assessment & Plan:  Lungs clear. No specific change at the moment. Continue Singulair. Continue as needed albuterol. Has not needed the Advair and a bit. 6. Lipid screening  -     Lipid Panel with Direct LDL reflex; Future; Expected date: 10/04/2023        Depression Screening and Follow-up Plan: Patient was screened for depression during today's encounter. They screened negative with a PHQ-2 score of 0.         Subjective      Chief Complaint   Patient presents with   • Follow-up     1 month follow up       Patient was seen about a month ago for knee pain. At that point, he did have some issues. X-ray was ordered, but he did not go as the pain resolved on its own. Has not come back. Patient did have a Lyme test, which was negative. Recently was in an attic with bee suit on. Felt quite hot at time, and was likely dehydrated. Was shaking after that. Still had tremor after. Tremor lasted for 3-4 weeks now. Weight went down. His wife reports that he is not eating well. Has been loosing weight with this. Reviewed weights in chart. No changes in bowels. No NVD. Has been easily agitated as well. Review of Systems   Constitutional: Positive for fatigue. HENT: Negative. Eyes: Negative. Respiratory: Negative. Cardiovascular: Negative. Gastrointestinal: Negative. Endocrine: Negative. Genitourinary: Negative. Musculoskeletal: Negative. Skin: Negative. Allergic/Immunologic: Negative. Neurological: Positive for tremors. Hematological: Negative. Psychiatric/Behavioral: Negative.         Current Outpatient Medications on File Prior to Visit   Medication Sig   • albuterol (2.5 mg/3 mL) 0.083 % nebulizer solution Take 1 vial (2.5 mg total) by nebulization every 6 (six) hours as needed for wheezing or shortness of breath   • albuterol (Proventil HFA) 90 mcg/act inhaler Inhale 2 puffs every 6 (six) hours as needed for wheezing   • loratadine (CLARITIN) 10 mg tablet Take 10 mg by mouth 2 (two) times a day   • montelukast (SINGULAIR) 10 mg tablet TAKE 1 TABLET BY MOUTH ONCE DAILY AT BEDTIME   • Multiple Vitamin (MULTIVITAMIN) capsule Take 1 capsule by mouth daily   • Fluticasone-Salmeterol (Advair) 100-50 mcg/dose inhaler INHALE 1 DOSE BY MOUTH TWICE DAILY (Patient not taking: Reported on 8/18/2023)   • lansoprazole (PREVACID) 30 mg capsule TAKE 1 CAPSULE (30 MG TOTAL) BY MOUTH DAILY FOR 90 DAYS       Objective     /78 (BP Location: Right arm, Patient Position: Sitting, Cuff Size: Large)   Pulse (!) 110   Resp 16   Ht 5' 3" (1.6 m)   Wt 61.2 kg (135 lb)   SpO2 97%   BMI 23.91 kg/m²     Physical Exam  Vitals and nursing note reviewed. Constitutional:       General: He is not in acute distress. Appearance: He is well-developed. He is not diaphoretic. HENT:      Head: Normocephalic and atraumatic. Right Ear: Hearing, tympanic membrane, ear canal and external ear normal.      Left Ear: Hearing, tympanic membrane, ear canal and external ear normal.      Nose: Nose normal.      Right Sinus: No maxillary sinus tenderness or frontal sinus tenderness. Left Sinus: No maxillary sinus tenderness or frontal sinus tenderness. Mouth/Throat:      Pharynx: Uvula midline. No oropharyngeal exudate. Eyes:      General: Lids are everted, no foreign bodies appreciated. Conjunctiva/sclera: Conjunctivae normal.      Pupils: Pupils are equal, round, and reactive to light. Neck:      Thyroid: No thyromegaly. Vascular: No carotid bruit. Trachea: No tracheal deviation. Cardiovascular:      Rate and Rhythm: Regular rhythm. Tachycardia present. Pulses:           Carotid pulses are 2+ on the right side and 2+ on the left side. Heart sounds: Normal heart sounds. No murmur heard. No friction rub. No gallop. Pulmonary:      Effort: Pulmonary effort is normal. No respiratory distress. Breath sounds: Normal breath sounds. No wheezing or rales. Abdominal:      General: Bowel sounds are normal. There is no distension. Palpations: Abdomen is soft. Tenderness: There is no abdominal tenderness. Musculoskeletal:      Cervical back: Normal range of motion and neck supple. Lymphadenopathy:      Cervical: No cervical adenopathy. Skin:     General: Skin is warm and dry. Neurological:      Mental Status: He is alert and oriented to person, place, and time.       Coordination: Coordination normal.   Psychiatric: Behavior: Behavior normal.         Thought Content:  Thought content normal.         Judgment: Judgment normal.       Lakia Torres MD

## 2023-10-04 NOTE — ASSESSMENT & PLAN NOTE
Patient has had a significant amount of weight loss lately. His wife mentions something about him having some anorexia. Would recommend check laboratory studies, and then follow-up after that. Clearly this weight loss amount in short order is not normal.  I would recommend follow-up afterwards.

## 2023-10-04 NOTE — ASSESSMENT & PLAN NOTE
Lungs clear. No specific change at the moment. Continue Singulair. Continue as needed albuterol. Has not needed the Advair and a bit.

## 2023-10-04 NOTE — PATIENT INSTRUCTIONS
1. Weight loss, unintentional  Assessment & Plan:  Patient has had a significant amount of weight loss lately. His wife mentions something about him having some anorexia. Would recommend check laboratory studies, and then follow-up after that. Clearly this weight loss amount in short order is not normal.  I would recommend follow-up afterwards. Orders:  -     TSH, 3rd generation; Future; Expected date: 10/04/2023  -     CBC and differential; Future  -     Comprehensive metabolic panel; Future; Expected date: 10/04/2023    2. Tremor  Assessment & Plan:  Patient does have tremors that are new. Check labs. Follow-up afterwards. Orders:  -     TSH, 3rd generation; Future; Expected date: 10/04/2023  -     CBC and differential; Future  -     Comprehensive metabolic panel; Future; Expected date: 10/04/2023  -     Magnesium; Future  -     Phosphorus; Future    3. Lesion of right nipple  Assessment & Plan:  Patient reports no changes. I did remind him that I recommended plastic surgery for removal previously. 4. Acute pain of right knee  Assessment & Plan:  Patient reports the knee pain is now resolved. We will follow in the future as needed. 5. Mild persistent asthma without complication  Assessment & Plan:  Lungs clear. No specific change at the moment. Continue Singulair. Continue as needed albuterol. Has not needed the Advair and a bit. 6. Lipid screening  -     Lipid Panel with Direct LDL reflex; Future; Expected date: 10/04/2023      COVID 19 Instructions    Jeremiah Narayan was advised to limit contact with others to essential tasks such as getting food, medications, and medical care. Proper handwashing reviewed, and Hand sanitzer when washing is not available. If the patient develops symptoms of COVID 19, the patient should call the office as soon as possible. It is strongly recommended that Flu Vaccinations be obtained.       Virtual Visits:  Roberto: This works on smart phones (any phone with Internet browsing capability). You should get a text message when the provider is ready to see you. Click on the link in the text message, and the call should start. You will need to type in your name, and allow camera and microphone access. This is HIPPA compliant, and secure. If you have not already done so, get immunized to COVID 19. We are committed to getting you vaccinated as soon as possible and will be closely following CDC and [facility] guidelines as they are released and revised. Please refer to our COVID-19 vaccine webpage for the most up to date information on the vaccine and our distribution efforts. This site will also have the most up to date recommendations for COVID booster vaccine. [email or web address]    Call [phone] ([phone]), option 7    You can also visit [email or web address] to find vaccines in your area. OUR LOCATION:    [facility]  [address] [facility], [address] [place] [address], [address] [place] [address]  Fax: [phone]    Lab services, Rheumatology, and OB/GYN are at this location as well.

## 2023-10-06 ENCOUNTER — APPOINTMENT (OUTPATIENT)
Dept: LAB | Facility: MEDICAL CENTER | Age: 58
End: 2023-10-06
Payer: COMMERCIAL

## 2023-10-06 DIAGNOSIS — Z13.220 LIPID SCREENING: ICD-10-CM

## 2023-10-06 DIAGNOSIS — R63.4 WEIGHT LOSS, UNINTENTIONAL: ICD-10-CM

## 2023-10-06 DIAGNOSIS — R25.1 TREMOR: ICD-10-CM

## 2023-10-06 LAB
ALBUMIN SERPL BCP-MCNC: 3.9 G/DL (ref 3.5–5)
ALP SERPL-CCNC: 89 U/L (ref 34–104)
ALT SERPL W P-5'-P-CCNC: 20 U/L (ref 7–52)
ANION GAP SERPL CALCULATED.3IONS-SCNC: 10 MMOL/L
AST SERPL W P-5'-P-CCNC: 19 U/L (ref 13–39)
BASOPHILS # BLD AUTO: 0.02 THOUSANDS/ÂΜL (ref 0–0.1)
BASOPHILS NFR BLD AUTO: 0 % (ref 0–1)
BILIRUB SERPL-MCNC: 0.88 MG/DL (ref 0.2–1)
BUN SERPL-MCNC: 19 MG/DL (ref 5–25)
CALCIUM SERPL-MCNC: 10 MG/DL (ref 8.4–10.2)
CHLORIDE SERPL-SCNC: 103 MMOL/L (ref 96–108)
CHOLEST SERPL-MCNC: 127 MG/DL
CO2 SERPL-SCNC: 27 MMOL/L (ref 21–32)
CREAT SERPL-MCNC: 0.82 MG/DL (ref 0.6–1.3)
EOSINOPHIL # BLD AUTO: 0.08 THOUSAND/ÂΜL (ref 0–0.61)
EOSINOPHIL NFR BLD AUTO: 2 % (ref 0–6)
ERYTHROCYTE [DISTWIDTH] IN BLOOD BY AUTOMATED COUNT: 11.5 % (ref 11.6–15.1)
GFR SERPL CREATININE-BSD FRML MDRD: 96 ML/MIN/1.73SQ M
GLUCOSE P FAST SERPL-MCNC: 102 MG/DL (ref 65–99)
HCT VFR BLD AUTO: 41.1 % (ref 36.5–49.3)
HDLC SERPL-MCNC: 40 MG/DL
HGB BLD-MCNC: 12.7 G/DL (ref 12–17)
IMM GRANULOCYTES # BLD AUTO: 0.01 THOUSAND/UL (ref 0–0.2)
IMM GRANULOCYTES NFR BLD AUTO: 0 % (ref 0–2)
LDLC SERPL CALC-MCNC: 69 MG/DL (ref 0–100)
LYMPHOCYTES # BLD AUTO: 1.22 THOUSANDS/ÂΜL (ref 0.6–4.47)
LYMPHOCYTES NFR BLD AUTO: 24 % (ref 14–44)
MAGNESIUM SERPL-MCNC: 1.8 MG/DL (ref 1.9–2.7)
MCH RBC QN AUTO: 24.9 PG (ref 26.8–34.3)
MCHC RBC AUTO-ENTMCNC: 30.9 G/DL (ref 31.4–37.4)
MCV RBC AUTO: 80 FL (ref 82–98)
MONOCYTES # BLD AUTO: 0.68 THOUSAND/ÂΜL (ref 0.17–1.22)
MONOCYTES NFR BLD AUTO: 13 % (ref 4–12)
NEUTROPHILS # BLD AUTO: 3.15 THOUSANDS/ÂΜL (ref 1.85–7.62)
NEUTS SEG NFR BLD AUTO: 61 % (ref 43–75)
NRBC BLD AUTO-RTO: 0 /100 WBCS
PHOSPHATE SERPL-MCNC: 4.3 MG/DL (ref 2.7–4.5)
PLATELET # BLD AUTO: 237 THOUSANDS/UL (ref 149–390)
PMV BLD AUTO: 12.5 FL (ref 8.9–12.7)
POTASSIUM SERPL-SCNC: 4.2 MMOL/L (ref 3.5–5.3)
PROT SERPL-MCNC: 6.7 G/DL (ref 6.4–8.4)
RBC # BLD AUTO: 5.11 MILLION/UL (ref 3.88–5.62)
SODIUM SERPL-SCNC: 140 MMOL/L (ref 135–147)
TRIGL SERPL-MCNC: 89 MG/DL
TSH SERPL DL<=0.05 MIU/L-ACNC: <0.01 UIU/ML (ref 0.45–4.5)
WBC # BLD AUTO: 5.16 THOUSAND/UL (ref 4.31–10.16)

## 2023-10-06 PROCEDURE — 84100 ASSAY OF PHOSPHORUS: CPT

## 2023-10-06 PROCEDURE — 80053 COMPREHEN METABOLIC PANEL: CPT

## 2023-10-06 PROCEDURE — 85025 COMPLETE CBC W/AUTO DIFF WBC: CPT

## 2023-10-06 PROCEDURE — 83735 ASSAY OF MAGNESIUM: CPT

## 2023-10-06 PROCEDURE — 80061 LIPID PANEL: CPT

## 2023-10-06 PROCEDURE — 84443 ASSAY THYROID STIM HORMONE: CPT

## 2023-10-06 PROCEDURE — 36415 COLL VENOUS BLD VENIPUNCTURE: CPT

## 2023-10-11 ENCOUNTER — TELEPHONE (OUTPATIENT)
Dept: FAMILY MEDICINE CLINIC | Facility: CLINIC | Age: 58
End: 2023-10-11

## 2023-10-11 NOTE — TELEPHONE ENCOUNTER
Hi, my name is Susan Moctezuma. I was calling in reference to my , Christi Suazo. Christi Suazo, His birthday is [birth year]. He called Doctor Tori Gale last week. He sent him for blood work. We did get the results back. I think it was Monday or Sunday that a lot of the levels were low and he was having some issues. So I didn't know if he needed to schedule a new appointment or what actually needs to be done because he's still feeling, not good at all. So you could either call me back [phone] or you can call his phone and if he can answer [phone]. Again, it's about the blood work and if he needs to get started on any kind of medications. Thanks so much.  Bye.

## 2023-10-12 DIAGNOSIS — E05.90 HYPERTHYROIDISM: Primary | ICD-10-CM

## 2023-10-12 NOTE — TELEPHONE ENCOUNTER
Spoke to pt wife and told her Dr. Harpreet Parkinson put in new labs for him to get done.  She said that they will go tomorrow

## 2023-10-12 NOTE — RESULT ENCOUNTER NOTE
Tests were not normal, and should follow at  your scheduled Office visit. Please call about this, if [employer] message is not available or not read by patient.

## 2023-10-13 ENCOUNTER — TELEPHONE (OUTPATIENT)
Dept: FAMILY MEDICINE CLINIC | Facility: CLINIC | Age: 58
End: 2023-10-13

## 2023-10-13 NOTE — TELEPHONE ENCOUNTER
Patient schedule [facility] thyroid imaging and central scheduling asked if patient had any type of seafood recently and patient said yes last week but it wasn't a full meal just a little bit of shrimp rice but the lady from central asked them to call us and ask  what's the limit since he didn't have a whole meal and they scheduled out for 3 weeks. They would like to know if they should still wait  the 3 weeks or schedule sooner since it wasn't a full meal. Please advise. Thank You.

## 2023-10-19 ENCOUNTER — RA CDI HCC (OUTPATIENT)
Dept: OTHER | Facility: HOSPITAL | Age: 58
End: 2023-10-19

## 2023-10-19 NOTE — PROGRESS NOTES
[address] [facility] coding opportunities       Chart reviewed, no opportunity found: CHART REVIEWED, NO OPPORTUNITY FOUND        Patients Insurance        Commercial Insurance: Ronald Farooq

## 2023-10-30 ENCOUNTER — HOSPITAL ENCOUNTER (OUTPATIENT)
Dept: RADIOLOGY | Facility: HOSPITAL | Age: 58
Discharge: HOME | End: 2023-10-30
Payer: COMMERCIAL

## 2023-10-30 DIAGNOSIS — E05.90 HYPERTHYROIDISM: ICD-10-CM

## 2023-10-30 PROCEDURE — 78014 THYROID IMAGING W/BLOOD FLOW: CPT

## 2023-10-30 PROCEDURE — G1004 CDSM NDSC: HCPCS

## 2023-10-30 PROCEDURE — A9516 IODINE I-123 SOD IODIDE MIC: HCPCS

## 2023-10-31 ENCOUNTER — HOSPITAL ENCOUNTER (OUTPATIENT)
Dept: RADIOLOGY | Facility: HOSPITAL | Age: 58
Discharge: HOME | End: 2023-10-31
Payer: COMMERCIAL

## 2023-10-31 NOTE — RESULT ENCOUNTER NOTE
Tests were not normal, and should follow at  your scheduled Office visit. Please call about this, if [employer] message is not available or not read by patient.

## 2023-11-01 ENCOUNTER — LAB (OUTPATIENT)
Dept: LAB | Facility: CLINIC | Age: 58
End: 2023-11-01
Payer: COMMERCIAL

## 2023-11-01 ENCOUNTER — OFFICE VISIT (OUTPATIENT)
Dept: FAMILY MEDICINE CLINIC | Facility: CLINIC | Age: 58
End: 2023-11-01
Payer: COMMERCIAL

## 2023-11-01 VITALS
WEIGHT: 136 LBS | SYSTOLIC BLOOD PRESSURE: 128 MMHG | BODY MASS INDEX: 24.1 KG/M2 | DIASTOLIC BLOOD PRESSURE: 62 MMHG | RESPIRATION RATE: 16 BRPM | OXYGEN SATURATION: 97 % | HEART RATE: 87 BPM | HEIGHT: 63 IN

## 2023-11-01 DIAGNOSIS — E05.00 GRAVES DISEASE: Primary | ICD-10-CM

## 2023-11-01 DIAGNOSIS — R25.1 TREMOR: ICD-10-CM

## 2023-11-01 DIAGNOSIS — E05.90 HYPERTHYROIDISM: ICD-10-CM

## 2023-11-01 LAB
T3 SERPL-MCNC: 5.4 NG/ML
T3FREE SERPL-MCNC: 18.32 PG/ML (ref 2.5–3.9)

## 2023-11-01 PROCEDURE — 86376 MICROSOMAL ANTIBODY EACH: CPT

## 2023-11-01 PROCEDURE — 84480 ASSAY TRIIODOTHYRONINE (T3): CPT

## 2023-11-01 PROCEDURE — 99214 OFFICE O/P EST MOD 30 MIN: CPT | Performed by: FAMILY MEDICINE

## 2023-11-01 PROCEDURE — 84445 ASSAY OF TSI GLOBULIN: CPT

## 2023-11-01 PROCEDURE — 84481 FREE ASSAY (FT-3): CPT

## 2023-11-01 PROCEDURE — 36415 COLL VENOUS BLD VENIPUNCTURE: CPT

## 2023-11-01 PROCEDURE — 84479 ASSAY OF THYROID (T3 OR T4): CPT

## 2023-11-01 RX ORDER — PROPYLTHIOURACIL 50 MG/1
150 TABLET ORAL 3 TIMES DAILY
Qty: 270 TABLET | Refills: 0 | Status: SHIPPED | OUTPATIENT
Start: 2023-11-01 | End: 2023-11-02

## 2023-11-01 NOTE — ASSESSMENT & PLAN NOTE
Significant issues currently. Appears to have thyroid storm. We will start propylthiouracil, and follow-up with endocrinology. With the thyroid storm, would recommend avoid physical work for now.

## 2023-11-01 NOTE — PROGRESS NOTES
Name: Karol Phillips      : 1965      MRN: [id number]  Encounter Provider: Lakia Torres MD  Encounter Date: 2023   Encounter department: [facility] PRIMARY CARE    Assessment & Plan     1. Graves disease  Assessment & Plan:  Significant issues currently. Appears to have thyroid storm. We will start propylthiouracil, and follow-up with endocrinology. With the thyroid storm, would recommend avoid physical work for now. Orders:  -     Ambulatory Referral to Endocrinology; Future  -     propylthiouracil 50 mg tablet; Take 3 tablets (150 mg total) by mouth 3 (three) times a day    2. Tremor  Assessment & Plan:  Likely due to Graves' disease. Follow-up with endocrinology recommended. Depression Screening and Follow-up Plan: Patient was screened for depression during today's encounter. They screened negative with a PHQ-2 score of 0. Subjective      Chief Complaint   Patient presents with   • Follow-up     3 weeks follow up       Patient is here to follow-up on his significant medical issues. Reviewed thyroid uptake scan. Does show evidence of Graves' disease. Review of Systems   Constitutional:  Positive for activity change. HENT: Negative. Respiratory: Negative. Cardiovascular:  Positive for palpitations. Neurological:  Positive for tremors. Psychiatric/Behavioral:  Positive for decreased concentration. The patient is nervous/anxious.         Current Outpatient Medications on File Prior to Visit   Medication Sig   • albuterol (2.5 mg/3 mL) 0.083 % nebulizer solution Take 1 vial (2.5 mg total) by nebulization every 6 (six) hours as needed for wheezing or shortness of breath   • albuterol (Proventil HFA) 90 mcg/act inhaler Inhale 2 puffs every 6 (six) hours as needed for wheezing   • loratadine (CLARITIN) 10 mg tablet Take 10 mg by mouth 2 (two) times a day   • montelukast (SINGULAIR) 10 mg tablet TAKE 1 TABLET BY MOUTH ONCE DAILY AT BEDTIME   • Multiple Vitamin (MULTIVITAMIN) capsule Take 1 capsule by mouth daily   • Fluticasone-Salmeterol (Advair) 100-50 mcg/dose inhaler INHALE 1 DOSE BY MOUTH TWICE DAILY (Patient not taking: Reported on 8/18/2023)   • lansoprazole (PREVACID) 30 mg capsule TAKE 1 CAPSULE (30 MG TOTAL) BY MOUTH DAILY FOR 90 DAYS       Objective     /62 (BP Location: Right arm, Patient Position: Sitting, Cuff Size: Large)   Pulse 87   Resp 16   Ht 5' 3" (1.6 m)   Wt 61.7 kg (136 lb)   SpO2 97%   BMI 24.09 kg/m²     Physical Exam  Vitals and nursing note reviewed. Constitutional:       Appearance: He is well-developed. HENT:      Head: Normocephalic and atraumatic. Cardiovascular:      Rate and Rhythm: Normal rate and regular rhythm. Pulses:           Carotid pulses are 2+ on the right side and 2+ on the left side. Heart sounds: Normal heart sounds. No murmur heard. No friction rub. No gallop. Pulmonary:      Effort: Pulmonary effort is normal. No respiratory distress. Breath sounds: Normal breath sounds. No wheezing or rales. Musculoskeletal:      Cervical back: Normal range of motion and neck supple.        Michael Christopher MD

## 2023-11-01 NOTE — PATIENT INSTRUCTIONS
1. Graves disease  Assessment & Plan:  Significant issues currently. Appears to have thyroid storm. We will start propylthiouracil, and follow-up with endocrinology. With the thyroid storm, would recommend avoid physical work for now. Orders:  -     Ambulatory Referral to Endocrinology; Future  -     propylthiouracil 50 mg tablet; Take 3 tablets (150 mg total) by mouth 3 (three) times a day    2. Tremor  Assessment & Plan:  Likely due to Graves' disease. Follow-up with endocrinology recommended. COVID 19 Instructions    Kaylah Abler was advised to limit contact with others to essential tasks such as getting food, medications, and medical care. Proper handwashing reviewed, and Hand sanitzer when washing is not available. If the patient develops symptoms of COVID 19, the patient should call the office as soon as possible. It is strongly recommended that Flu Vaccinations be obtained. Virtual Visits:  Roberto: This works on smart phones (any phone with Internet browsing capability). You should get a text message when the provider is ready to see you. Click on the link in the text message, and the call should start. You will need to type in your name, and allow camera and microphone access. This is HIPPA compliant, and secure. If you have not already done so, get immunized to COVID 19. We are committed to getting you vaccinated as soon as possible and will be closely following CDC and [employer] guidelines as they are released and revised. Please refer to our COVID-19 vaccine webpage for the most up to date information on the vaccine and our distribution efforts. This site will also have the most up to date recommendations for COVID booster vaccine. Alvino    Call [phone] ([phone]), option 7    You can also visit [email or web address] to find vaccines in your area.     OUR LOCATION:    [facility] Shyam Cavanaugh, [address] [place] [address], [address] [facility]  Fax: [phone]    Lab services, Rheumatology, and OB/GYN are at this location as well.

## 2023-11-02 ENCOUNTER — APPOINTMENT (OUTPATIENT)
Dept: LAB | Facility: CLINIC | Age: 58
End: 2023-11-02
Payer: COMMERCIAL

## 2023-11-02 ENCOUNTER — CONSULT (OUTPATIENT)
Dept: ENDOCRINOLOGY | Facility: CLINIC | Age: 58
End: 2023-11-02
Payer: COMMERCIAL

## 2023-11-02 VITALS
HEIGHT: 63 IN | WEIGHT: 134.4 LBS | BODY MASS INDEX: 23.81 KG/M2 | TEMPERATURE: 97.5 F | SYSTOLIC BLOOD PRESSURE: 118 MMHG | DIASTOLIC BLOOD PRESSURE: 64 MMHG | OXYGEN SATURATION: 99 % | HEART RATE: 90 BPM

## 2023-11-02 DIAGNOSIS — E05.00 GRAVES DISEASE: ICD-10-CM

## 2023-11-02 DIAGNOSIS — E05.90 HYPERTHYROIDISM: ICD-10-CM

## 2023-11-02 DIAGNOSIS — E05.00 GRAVES DISEASE: Primary | ICD-10-CM

## 2023-11-02 LAB
T3RU NFR SERPL: 51 % (ref 24–39)
T4 FREE SERPL-MCNC: 4.6 NG/DL (ref 0.61–1.12)
THYROPEROXIDASE AB SERPL-ACNC: 153 IU/ML (ref 0–34)

## 2023-11-02 PROCEDURE — 36415 COLL VENOUS BLD VENIPUNCTURE: CPT

## 2023-11-02 PROCEDURE — 84439 ASSAY OF FREE THYROXINE: CPT

## 2023-11-02 PROCEDURE — 99244 OFF/OP CNSLTJ NEW/EST MOD 40: CPT | Performed by: STUDENT IN AN ORGANIZED HEALTH CARE EDUCATION/TRAINING PROGRAM

## 2023-11-02 RX ORDER — METHIMAZOLE 10 MG/1
10 TABLET ORAL 2 TIMES DAILY
Qty: 60 TABLET | Refills: 2 | Status: SHIPPED | OUTPATIENT
Start: 2023-11-02 | End: 2024-01-31

## 2023-11-02 NOTE — RESULT ENCOUNTER NOTE
Tests were not normal, and should follow at  your scheduled Office visit. Please call about this, if [employer] message is not available or not read by patient.

## 2023-11-02 NOTE — PROGRESS NOTES
Lawrence Cerrato is a 60 y.o. male who is referred by PCP for evaluation of hyperthyroidism. He was evaluated for weight loss, he lost 20 Ib in 2 month, despite having normal appetite. Associated signs/symptoms due to hyperthyroidism:       Change in Energy levels                    Yes   Heat intolerance/ cold intolerance      Yes   Shakes/ tremors/ palpitations             Yes   Diarrhea/constipation                          Yes   Difficulty in sleeping                            Yes   Anxiety                                                Yes   Hair loss                                              Yes   Dry eyes/FB sensation                        No  Eye pain                                              yes  Tearing/redness/swellling                   yes         Swelling in the neck: No  Obstructive symptoms: No trouble breathing, swallowing or hoarseness in voice     No h/o Iodine exposure  Family history of thyroid disease: mother with hypothyroidism,       Thyroid function test was done which showed TSH of less than 0.010, free T4 of 18.32 pg/mL, total T3 of 5.4 ng/mL, free T4 was not checked, TSI is in process. Thyroid iron scan and uptake was done and showed elevated uptake values consistent with Graves' disease. Review of Systems   Constitutional:  Positive for fatigue and unexpected weight change. Negative for appetite change. HENT:  Negative for trouble swallowing and voice change. Eyes:  Negative for visual disturbance. Respiratory:  Negative for cough, shortness of breath and wheezing. Cardiovascular:  Positive for palpitations. Negative for leg swelling. Gastrointestinal:  Positive for diarrhea. Negative for abdominal pain, constipation, nausea and vomiting. Endocrine: Positive for heat intolerance. Negative for cold intolerance, polyphagia and polyuria. Musculoskeletal:  Negative for arthralgias. Skin:  Negative for color change, rash and wound.    Neurological:  Positive for tremors. Negative for dizziness, weakness, light-headedness, numbness and headaches. Psychiatric/Behavioral:  Negative for agitation and sleep disturbance. The patient is not nervous/anxious. Past medical/surgical history  Past Medical History:   Diagnosis Date    Asthma         History reviewed. No pertinent surgical history.      Family History:  Family History   Problem Relation Age of Onset    Diabetes Mother     Hypertension Mother     Thyroid disease Mother         thyroid disorder    Prostate cancer Father         Social History:  Social History     Tobacco Use   Smoking Status Never   Smokeless Tobacco Never        Social History     Substance and Sexual Activity   Alcohol Use Never        Physical Examination:  Vitals:    11/02/23 1354   BP: 118/64   Pulse: 90   Temp: 97.5 °F (36.4 °C)   SpO2: 99%     Weight : 134 Ib  General appearance - alert, well appearing, and in no distress  Mental status - normal mood, behavior, speech, dress, motor activity, and thought processes  Head/Eyes:  NC/AT EOMI; no staring gaze/ lid lag retraction; no proptosis  mucous membranes moist, pharynx normal without lesions  Neck - supple, no significant adenopathy  Chest - clear to auscultation, no wheezes, rales or rhonchi, symmetric air entry  Heart - normal rate, regular rhythm, normal S1, S2, no murmurs, rubs, clicks or gallops  Abdomen - soft, nontender, non-distended,  Neurological - DTR's normal and symmetric, motor and sensory grossly normal bilaterally  Thyroid -normal size, not tender, no nodule noted  Extremities: tremors +     Labs:  Lab Results   Component Value Date    Q3JKWTI 5.4 (H) 11/01/2023        Lab Results   Component Value Date    BUN 19 10/06/2023    K 4.2 10/06/2023     10/06/2023    CO2 27 10/06/2023        Lab Results   Component Value Date    CALCIUM 10.0 10/06/2023    PHOS 4.3 10/06/2023        Imaging:    Result Text   THYROID SCAN AND UPTAKE     INDICATION:  E05.90: Thyrotoxicosis, unspecified without thyrotoxic crisis or storm. []     COMPARISON: None     TECHNIQUE:  The study was performed following the oral administration of 273 uCi I-123. FINDINGS:     The thyroid scan demonstrates normal and symmetrical distribution of the radiopharmaceutical throughout both thyroid lobes without evidence of a discrete cold or hot nodule. Thyroid uptake values are:     6 hours:   47.5% (N =  5 -15%)     24 hours: 60.7% (N =15 - 35%)     IMPRESSION:     1. Normal thyroid scan. 2. Elevated uptake values. 3. Findings consistent with Graves' disease in the appropriate clinical setting. Previous records including pertinent laboratory and radiographic results were reviewed and are summarized in the HPI and plan below. ASSESSMENT/PLAN:              1. Graves disease    Foreign To has been experiencing hyperthyroidism symptoms including palpitations, tremor, weight loss, loose stool, for which TFT was checked and showed suppressed TSH with elevated T3 of 5.4 ng/mL, (T4 was not checked). TSI is in process, thyroid iodine uptake scan is in favor of Graves' disease. We reviewed differential diagnosis including Graves' disease, toxic adenoma and thyroiditis, the likely diagnosis of Graves' disease. Reviewed pathophysiology of hyperthyroidism, reviewing the hypothalamic-ptiuitary-thyroid axis and interpretation and significance of TSH, FT4, FT3 values. Discussed possible etiologies of hyperthyroidism  Discussed possible treatments along with potential side effects, based on etiology: anithyroid drugs, JOHNSON ablation, monitoring (if thyroiditis) and surgery (rarely indicated). Reviewed side effects of thionamide treatment which include rash, liver dysfunction and WBC suppression. Methimazole 10 mg twice a day was started, he will call the office if he develops adverse reactions including skin rash, jaundice, sore throat or fever.   I started metoprolol 5 mg daily (he has mild intermittent asthma, has not needed inhaler for a while). Check free T4  Follow TSI. Return back in 3 months for next    - Ambulatory Referral to Endocrinology  - methimazole (TAPAZOLE) 10 mg tablet; Take 1 tablet (10 mg total) by mouth 2 (two) times a day  Dispense: 60 tablet; Refill: 2  - T4, free Lab Collect; Future  - metoprolol tartrate (LOPRESSOR) tablet 25 mg    2. Hyperthyroidism  See plan     I have spent a total time of 40 minutes on 11/02/23 in caring for this patient including Diagnostic results, Prognosis, Risks and benefits of tx options, Instructions for management, Patient and family education, Importance of tx compliance, Risk factor reductions, Impressions, Counseling / Coordination of care, Documenting in the medical record, Reviewing / ordering tests, medicine, procedures  , Obtaining or reviewing history  , and Communicating with other healthcare professionals .        --

## 2023-11-03 LAB — TSI SER-ACNC: 7.33 IU/L (ref 0–0.55)

## 2023-11-03 NOTE — RESULT ENCOUNTER NOTE
Tests were not normal, and should follow at  your scheduled Office visit. Please call about this, if [employer] message is not available or not read by patient.

## 2023-11-06 NOTE — TELEPHONE ENCOUNTER
Please have the [facility] pharmacist call us for what would be a good ICS/LABA for him with the insurance benefits that he has  Otherwise, see Pulmonology  Recheck on patient. Discussed with patient ED findings and plan for discharge. Patient was given ED warnings, discharge instructions, and follow up information to go home with. Patient understands and agrees with plan for discharge. Any questions have been answered.

## 2023-11-27 DIAGNOSIS — J30.89 NON-SEASONAL ALLERGIC RHINITIS, UNSPECIFIED TRIGGER: ICD-10-CM

## 2023-11-27 RX ORDER — MONTELUKAST SODIUM 10 MG/1
TABLET ORAL
Qty: 30 TABLET | Refills: 5 | Status: SHIPPED | OUTPATIENT
Start: 2023-11-27

## 2023-12-06 ENCOUNTER — RA CDI HCC (OUTPATIENT)
Dept: OTHER | Facility: HOSPITAL | Age: 58
End: 2023-12-06

## 2023-12-06 NOTE — PROGRESS NOTES
[address] [facility] coding opportunities       Chart reviewed, no opportunity found: CHART REVIEWED, NO OPPORTUNITY FOUND        Patients Insurance        Commercial Insurance: Ronald Farooq

## 2023-12-12 ENCOUNTER — TELEMEDICINE (OUTPATIENT)
Dept: FAMILY MEDICINE CLINIC | Facility: CLINIC | Age: 58
End: 2023-12-12
Payer: COMMERCIAL

## 2023-12-12 VITALS — WEIGHT: 134.6 LBS | HEIGHT: 63 IN | BODY MASS INDEX: 23.85 KG/M2

## 2023-12-12 DIAGNOSIS — J45.30 MILD PERSISTENT ASTHMA WITHOUT COMPLICATION: ICD-10-CM

## 2023-12-12 DIAGNOSIS — E05.00 GRAVES DISEASE: Primary | ICD-10-CM

## 2023-12-12 DIAGNOSIS — J01.40 ACUTE NON-RECURRENT PANSINUSITIS: ICD-10-CM

## 2023-12-12 PROCEDURE — 99214 OFFICE O/P EST MOD 30 MIN: CPT | Performed by: FAMILY MEDICINE

## 2023-12-12 RX ORDER — AMOXICILLIN 875 MG/1
875 TABLET, COATED ORAL 2 TIMES DAILY
Qty: 20 TABLET | Refills: 0 | Status: SHIPPED | OUTPATIENT
Start: 2023-12-12 | End: 2023-12-22

## 2023-12-12 NOTE — PATIENT INSTRUCTIONS
1. Graves disease  Assessment & Plan:  Improving symptoms on medications from endocrinology. Less heart rate problems. He does have an Apple Watch with heart rate notification, and the alarms have not been going off since he started on beta-blocker and Tapazole. Continue to follow with endocrine. Labs per endocrinology. 2. Mild persistent asthma without complication  Assessment & Plan:  Stable. Doing quite well. Not needing any treatments. No adverse effects from being on beta-blockers. 3. Acute non-recurrent pansinusitis  Comments:  Increase symptoms for the last week. Not likely consistent with COVID, and negative home test.  Unlikely influenza. Treat with amoxicillin for sinus infection  Orders:  -     amoxicillin (AMOXIL) 875 mg tablet; Take 1 tablet (875 mg total) by mouth 2 (two) times a day for 10 days        COVID 19 Instructions    Janina Roselyn was advised to limit contact with others to essential tasks such as getting food, medications, and medical care. Proper handwashing reviewed, and Hand sanitzer when washing is not available. If the patient develops symptoms of COVID 19, the patient should call the office as soon as possible. It is strongly recommended that Flu Vaccinations be obtained. Virtual Visits:  Roberto: This works on smart phones (any phone with Internet browsing capability). You should get a text message when the provider is ready to see you. Click on the link in the text message, and the call should start. You will need to type in your name, and allow camera and microphone access. This is HIPPA compliant, and secure. If you have not already done so, get immunized to COVID 19. We are committed to getting you vaccinated as soon as possible and will be closely following CDC and [employer] guidelines as they are released and revised.   Please refer to our COVID-19 vaccine webpage for the most up to date information on the vaccine and our Dr. Lamar (Cardiology),   Phone: (   )    -  Fax: (   )    -  Established Patient  Follow Up Time: 1-3 days    Melody Rothman)  Vascular Surgery  [address] [place] [address]  Phone: [phone]  Fax: [phone]  Established Patient  Follow Up Time: 1 week   distribution efforts. This site will also have the most up to date recommendations for COVID booster vaccine. Alvino    Call [phone]), option 7    You can also visit [email or web address] to find vaccines in your area. OUR LOCATION:    [facility]  [address] [facility], [address] [place] [address], [facility]  Fax: [phone]    Lab services, Rheumatology, and OB/GYN are at this location as well.

## 2023-12-12 NOTE — PROGRESS NOTES
Virtual Regular Visit    Verification of patient location:    Patient is located at Home in the following state in which I hold an active license [place]      Assessment/Plan:    1. Graves disease  Assessment & Plan:  Improving symptoms on medications from endocrinology. Less heart rate problems. He does have an Apple Watch with heart rate notification, and the alarms have not been going off since he started on beta-blocker and Tapazole. Continue to follow with endocrine. Labs per endocrinology. 2. Mild persistent asthma without complication  Assessment & Plan:  Stable. Doing quite well. Not needing any treatments. No adverse effects from being on beta-blockers. 3. Acute non-recurrent pansinusitis  Comments:  Increase symptoms for the last week. Not likely consistent with COVID, and negative home test.  Unlikely influenza. Treat with amoxicillin for sinus infection  Orders:  -     amoxicillin (AMOXIL) 875 mg tablet; Take 1 tablet (875 mg total) by mouth 2 (two) times a day for 10 days             Reason for visit is   Chief Complaint   Patient presents with   • Virtual Regular Visit   • Graves' Disease        Encounter provider Lay Cross MD    Provider located at [address] [place] [id number] [phone]      Recent Visits  No visits were found meeting these conditions. Showing recent visits within past 7 days and meeting all other requirements  Today's Visits  Date Type Provider Dept   12/12/23 Telemedicine Lay Cross MD [facility] Primary Care   Showing today's visits and meeting all other requirements  Future Appointments  No visits were found meeting these conditions. Showing future appointments within next 150 days and meeting all other requirements       The patient was identified by name and date of birth.  Homero Paola was informed that this is a telemedicine visit and that the visit is being conducted through the [employer]. He agrees to proceed. .  My office door was closed. No one else was in the room. He acknowledged consent and understanding of privacy and security of the video platform. The patient has agreed to participate and understands they can discontinue the visit at any time. Patient is aware this is a billable service. Shubham Lazcano is a 60 y.o. male   Chief Complaint   Patient presents with   • Virtual Regular Visit   • Graves' Disease      . Patient was due to follow-up for Graves' disease. He also did have some symptoms of COVID, and did a test at home which was negative. He also had some sinus symptoms with congestion and irritation and postnasal drip. Graves: On Methimazole, helping. Metoprolol started by Endo. Improving. HR OK now. Feels normal.    Weight: He feels that he gained, but did not check lately. Sinus started last week. Getting worse. HA, PN gtt. No tooth pain. Some face pain, and body aches. Asthma: He was started on Beta Blocker, but doing well now. Past Medical History:   Diagnosis Date   • Asthma        History reviewed. No pertinent surgical history.     Current Outpatient Medications   Medication Sig Dispense Refill   • albuterol (2.5 mg/3 mL) 0.083 % nebulizer solution Take 1 vial (2.5 mg total) by nebulization every 6 (six) hours as needed for wheezing or shortness of breath 24 vial 0   • albuterol (Proventil HFA) 90 mcg/act inhaler Inhale 2 puffs every 6 (six) hours as needed for wheezing 6.7 g 5   • amoxicillin (AMOXIL) 875 mg tablet Take 1 tablet (875 mg total) by mouth 2 (two) times a day for 10 days 20 tablet 0   • Fluticasone-Salmeterol (Advair) 100-50 mcg/dose inhaler INHALE 1 DOSE BY MOUTH TWICE DAILY 14 blister 5   • lansoprazole (PREVACID) 30 mg capsule TAKE 1 CAPSULE (30 MG TOTAL) BY MOUTH DAILY FOR 90 DAYS 90 capsule 1   • loratadine (CLARITIN) 10 mg tablet Take 10 mg by mouth 2 (two) times a day • methimazole (TAPAZOLE) 10 mg tablet Take 1 tablet (10 mg total) by mouth 2 (two) times a day 60 tablet 2   • metoprolol tartrate (LOPRESSOR) 25 mg tablet Take 1 tablet (25 mg total) by mouth every 12 (twelve) hours 180 tablet 0   • montelukast (SINGULAIR) 10 mg tablet TAKE 1 TABLET BY MOUTH ONCE DAILY AT BEDTIME 30 tablet 5   • Multiple Vitamin (MULTIVITAMIN) capsule Take 1 capsule by mouth daily       No current facility-administered medications for this visit. No Known Allergies    Review of Systems   Constitutional:  Negative for appetite change, chills, diaphoresis, fatigue and fever. HENT:  Positive for congestion, sinus pressure, sinus pain, sore throat and trouble swallowing. Negative for ear pain, facial swelling, postnasal drip, rhinorrhea and voice change. Respiratory:  Positive for cough. Negative for chest tightness, shortness of breath and wheezing. Cardiovascular:  Negative for chest pain. Gastrointestinal:  Negative for abdominal pain, diarrhea, nausea and vomiting. Musculoskeletal:  Positive for myalgias. Neurological:  Positive for headaches. Video Exam    Vitals:    12/12/23 1615   Weight: 61.1 kg (134 lb 9.6 oz)   Height: 5' 3" (1.6 m)       Physical Exam  Nursing note reviewed. Constitutional:       General: He is not in acute distress. Appearance: He is well-developed. HENT:      Head: Normocephalic and atraumatic. Pulmonary:      Effort: Pulmonary effort is normal.      Breath sounds: Normal breath sounds.           Visit Time  Total Visit Duration: 20

## 2023-12-12 NOTE — ASSESSMENT & PLAN NOTE
Stable. Doing quite well. Not needing any treatments. No adverse effects from being on beta-blockers.

## 2023-12-12 NOTE — ASSESSMENT & PLAN NOTE
Improving symptoms on medications from endocrinology. Less heart rate problems. He does have an Apple Watch with heart rate notification, and the alarms have not been going off since he started on beta-blocker and Tapazole. Continue to follow with endocrine. Labs per endocrinology.

## 2024-01-12 ENCOUNTER — TELEPHONE (OUTPATIENT)
Age: 59
End: 2024-01-12

## 2024-01-12 ENCOUNTER — TELEMEDICINE (OUTPATIENT)
Dept: FAMILY MEDICINE CLINIC | Facility: CLINIC | Age: 59
End: 2024-01-12
Payer: COMMERCIAL

## 2024-01-12 DIAGNOSIS — E05.00 GRAVES DISEASE: ICD-10-CM

## 2024-01-12 DIAGNOSIS — R51.9 ACUTE NONINTRACTABLE HEADACHE, UNSPECIFIED HEADACHE TYPE: Primary | ICD-10-CM

## 2024-01-12 PROCEDURE — 99214 OFFICE O/P EST MOD 30 MIN: CPT | Performed by: FAMILY MEDICINE

## 2024-01-12 NOTE — ASSESSMENT & PLAN NOTE
Patient does have Graves' disease.  Unlikely to have been causing problems with HA.    Since he is having some changes in his symptoms with Graves', he should talk to endocrinology about adjustment in medication.

## 2024-01-12 NOTE — PATIENT INSTRUCTIONS
1. Acute nonintractable headache, unspecified headache type  Assessment & Plan:  For the headache, the patient could try Excedrin Migraine if he wished, or if he has increasing symptoms can try anti-inflammatory such as Aleve or Advil.  CT brain.  Patient's wife reports he does have headaches almost daily, so this is a worsening of prior pattern.  CT to rule out bleeds/CVA/structural changes.    Orders:  -     CT head wo contrast; Future; Expected date: 01/12/2024    2. Graves disease  Assessment & Plan:  Patient does have Graves' disease.  Unlikely to have been causing problems with HA.    Since he is having some changes in his symptoms with Graves', he should talk to endocrinology about adjustment in medication.          COVID 19 Instructions    Noel Bird was advised to limit contact with others to essential tasks such as getting food, medications, and medical care.    Proper handwashing reviewed, and Hand sanitzer when washing is not available.    If the patient develops symptoms of COVID 19, the patient should call the office as soon as possible.    It is strongly recommended that Flu Vaccinations be obtained.      Virtual Visits:  Roberto: This works on smart phones (any phone with Internet browsing capability).  You should get a text message when the provider is ready to see you.  Click on the link in the text message, and the call should start.  You will need to type in your name, and allow camera and microphone access.  This is HIPPA compliant, and secure.      If you have not already done so, get immunized to COVID 19.      We are committed to getting you vaccinated as soon as possible and will be closely following CDC and [facility] guidelines as they are released and revised.  Please refer to our COVID-19 vaccine webpage for the most up to date information on the vaccine and our distribution efforts.    This site will also have the most up to date recommendations for COVID booster  vaccine.    [email or web address].org/covid-19/protect-[email or web address]-vaccine    Call [phone] ([phone]), option 7    You can also visit https://www.vaccines.gov/ to find vaccines in your area.    OUR LOCATION:    [facility]  [address] [place] [address], [address]  [place], [place], [address] [phone]  Fax: [phone]    Lab services, Rheumatology, and OB/GYN are at this location as well.

## 2024-01-12 NOTE — TELEPHONE ENCOUNTER
Pt wife called in requesting a same day virtual visit for patient. Patient has been having migraines, and has a lot of pain behind his eyes. He doesn't see his specialist for Graves Disease until February. Warm Transfer to office Clerical

## 2024-01-12 NOTE — ASSESSMENT & PLAN NOTE
For the headache, the patient could try Excedrin Migraine if he wished, or if he has increasing symptoms can try anti-inflammatory such as Aleve or Advil.  CT brain.  Patient's wife reports he does have headaches almost daily, so this is a worsening of prior pattern.  CT to rule out bleeds/CVA/structural changes.

## 2024-01-12 NOTE — PROGRESS NOTES
Virtual Regular Visit    Verification of patient location:    Patient is located at Home in the following state in which I hold an active license [place]      Assessment/Plan:    1. Acute nonintractable headache, unspecified headache type  Assessment & Plan:  For the headache, the patient could try Excedrin Migraine if he wished, or if he has increasing symptoms can try anti-inflammatory such as Aleve or Advil.  CT brain.  Patient's wife reports he does have headaches almost daily, so this is a worsening of prior pattern.  CT to rule out bleeds/CVA/structural changes.    Orders:  -     CT head wo contrast; Future; Expected date: 01/12/2024    2. Graves disease  Assessment & Plan:  Patient does have Graves' disease.  Unlikely to have been causing problems with HA.    Since he is having some changes in his symptoms with Graves', he should talk to endocrinology about adjustment in medication.                   Reason for visit is   Chief Complaint   Patient presents with   • Virtual Regular Visit   • Headache   • Graves' Disease          Encounter provider Alan Haynes MD    Provider located at [address] [address] [phone]      Recent Visits  No visits were found meeting these conditions.  Showing recent visits within past 7 days and meeting all other requirements  Today's Visits  Date Type Provider Dept   01/12/24 Telemedicine Alan Haynes MD [facility]   Showing today's visits and meeting all other requirements  Future Appointments  No visits were found meeting these conditions.  Showing future appointments within next 150 days and meeting all other requirements       The patient was identified by name and date of birth. Noel Bird was informed that this is a telemedicine visit and that the visit is being conducted through the Epic Embedded platform. He agrees to proceed..  My office door was closed. No one  else was in the room.  He acknowledged consent and understanding of privacy and security of the video platform. The patient has agreed to participate and understands they can discontinue the visit at any time.    Patient is aware this is a billable service.     Subjective  Noel Bird is a 58 y.o. male   Chief Complaint   Patient presents with   • Virtual Regular Visit   • Headache   • Graves' Disease      .      Here on video.  Had severe HA. Was last night.  Woke up with this.  Got worse, and then intensified.  Still with some pain behind the eyes.  Had used tylenol, but minimal help.  Patient felt almost like he had been run over by a truck.    No numbness/ tingling now.    Pain was quite severe.    No aura.    HA during the day, but like lightswitch it intensified.    Today, feels some aches.  HA as well, but much better.           Past Medical History:   Diagnosis Date   • Asthma        History reviewed. No pertinent surgical history.    Current Outpatient Medications   Medication Sig Dispense Refill   • albuterol (2.5 mg/3 mL) 0.083 % nebulizer solution Take 1 vial (2.5 mg total) by nebulization every 6 (six) hours as needed for wheezing or shortness of breath 24 vial 0   • albuterol (Proventil HFA) 90 mcg/act inhaler Inhale 2 puffs every 6 (six) hours as needed for wheezing 6.7 g 5   • Fluticasone-Salmeterol (Advair) 100-50 mcg/dose inhaler INHALE 1 DOSE BY MOUTH TWICE DAILY 14 blister 5   • lansoprazole (PREVACID) 30 mg capsule TAKE 1 CAPSULE (30 MG TOTAL) BY MOUTH DAILY FOR 90 DAYS 90 capsule 1   • loratadine (CLARITIN) 10 mg tablet Take 10 mg by mouth 2 (two) times a day     • methimazole (TAPAZOLE) 10 mg tablet Take 1 tablet (10 mg total) by mouth 2 (two) times a day 60 tablet 2   • metoprolol tartrate (LOPRESSOR) 25 mg tablet Take 1 tablet (25 mg total) by mouth every 12 (twelve) hours 180 tablet 0   • montelukast (SINGULAIR) 10 mg tablet TAKE 1 TABLET BY MOUTH ONCE DAILY AT BEDTIME 30 tablet 5   •  Multiple Vitamin (MULTIVITAMIN) capsule Take 1 capsule by mouth daily       No current facility-administered medications for this visit.        No Known Allergies    Review of Systems    Video Exam    There were no vitals filed for this visit.    Physical Exam     Visit Time  Total Visit Duration: 20

## 2024-01-17 ENCOUNTER — TELEPHONE (OUTPATIENT)
Dept: FAMILY MEDICINE CLINIC | Facility: CLINIC | Age: 59
End: 2024-01-17

## 2024-01-17 DIAGNOSIS — R51.9 ACUTE NONINTRACTABLE HEADACHE, UNSPECIFIED HEADACHE TYPE: Primary | ICD-10-CM

## 2024-01-17 NOTE — TELEPHONE ENCOUNTER
[facility]  The prior authorization request for CT head wo contrast has not been approved. You can schedule a peer to peer by calling [facility] at phone # [phone] with the deadline date of 1/18. Tracking # [id number]. The insurance determined the following:    [x] Does not meet Medical Necessity  [ ] No prior imaging  [ ] PT or conservative treatment incomplete  [ ] Missing Labs  [ ] Frequency    Denial Rationale:  The following notes were requested but have not been sent: doctor's notes that say your headaches are worse (increased frequency or intensity). We also need to know why you cannot do a different test (MRI (Magnetic Resonance Imaging)).    Clinicals uploaded:  OFFICE NOTE, LABS, MEDICAL HISTORY    If you choose not to complete a peer to peer then please reply to this message to let us know. Please notify your patient and contact central scheduling by calling [phone] to cancel the appointment and cancel the order in Epic.    Thank you,  Ragini

## 2024-01-18 DIAGNOSIS — R51.9 ACUTE NONINTRACTABLE HEADACHE, UNSPECIFIED HEADACHE TYPE: Primary | ICD-10-CM

## 2024-01-18 NOTE — TELEPHONE ENCOUNTER
Scheduled pt for 1/18 at 5pm at [address] [place] [address]. Left vm advising pt that this was done, and to please call Central Scheduling at [phone] if this time does not work for him.

## 2024-01-25 ENCOUNTER — TELEPHONE (OUTPATIENT)
Dept: BARIATRICS | Facility: CLINIC | Age: 59
End: 2024-01-25

## 2024-01-25 NOTE — TELEPHONE ENCOUNTER
Spouse/Yenifer is calling that Pt has upcoming appt on 2/8 with Dr. Pinto.    She is calling to see if Pt has to have Thyroid labs completed prior to appt?

## 2024-01-29 ENCOUNTER — TELEPHONE (OUTPATIENT)
Dept: ENDOCRINOLOGY | Facility: CLINIC | Age: 59
End: 2024-01-29

## 2024-01-29 DIAGNOSIS — E05.00 GRAVES DISEASE: Primary | ICD-10-CM

## 2024-01-29 DIAGNOSIS — E05.90 HYPERTHYROIDISM: ICD-10-CM

## 2024-01-29 RX ORDER — METHIMAZOLE 10 MG/1
10 TABLET ORAL 2 TIMES DAILY
Qty: 180 TABLET | Refills: 0 | Status: SHIPPED | OUTPATIENT
Start: 2024-01-29 | End: 2024-04-28

## 2024-01-29 NOTE — TELEPHONE ENCOUNTER
Patient's wife is calling back regarding her husbands labs that he needs to complete prior to appt.    Patient also needs a refill for Methimazole, and Metoprolol sent to [facility] on [address]

## 2024-02-06 ENCOUNTER — APPOINTMENT (OUTPATIENT)
Dept: LAB | Facility: MEDICAL CENTER | Age: 59
End: 2024-02-06
Payer: COMMERCIAL

## 2024-02-06 DIAGNOSIS — E05.00 GRAVES DISEASE: ICD-10-CM

## 2024-02-06 LAB
T3 SERPL-MCNC: 0.7 NG/ML
T4 FREE SERPL-MCNC: <0.25 NG/DL (ref 0.61–1.12)
TSH SERPL DL<=0.05 MIU/L-ACNC: 25.45 UIU/ML (ref 0.45–4.5)

## 2024-02-06 PROCEDURE — 84480 ASSAY TRIIODOTHYRONINE (T3): CPT

## 2024-02-06 PROCEDURE — 84443 ASSAY THYROID STIM HORMONE: CPT

## 2024-02-06 PROCEDURE — 84439 ASSAY OF FREE THYROXINE: CPT

## 2024-02-06 PROCEDURE — 36415 COLL VENOUS BLD VENIPUNCTURE: CPT

## 2024-02-08 ENCOUNTER — TELEPHONE (OUTPATIENT)
Dept: ENDOCRINOLOGY | Facility: CLINIC | Age: 59
End: 2024-02-08

## 2024-02-08 ENCOUNTER — OFFICE VISIT (OUTPATIENT)
Dept: ENDOCRINOLOGY | Facility: CLINIC | Age: 59
End: 2024-02-08
Payer: COMMERCIAL

## 2024-02-08 VITALS
HEART RATE: 61 BPM | SYSTOLIC BLOOD PRESSURE: 140 MMHG | HEIGHT: 63 IN | TEMPERATURE: 98.2 F | BODY MASS INDEX: 27.36 KG/M2 | DIASTOLIC BLOOD PRESSURE: 80 MMHG | WEIGHT: 154.4 LBS | OXYGEN SATURATION: 98 %

## 2024-02-08 DIAGNOSIS — E03.2 IATROGENIC HYPOTHYROIDISM: ICD-10-CM

## 2024-02-08 DIAGNOSIS — E05.00 GRAVES DISEASE: Primary | ICD-10-CM

## 2024-02-08 PROCEDURE — 99214 OFFICE O/P EST MOD 30 MIN: CPT | Performed by: STUDENT IN AN ORGANIZED HEALTH CARE EDUCATION/TRAINING PROGRAM

## 2024-02-08 NOTE — TELEPHONE ENCOUNTER
Pt called to clarify which med they are top stop taking and were unclear after reading the doctor's note.

## 2024-02-08 NOTE — ASSESSMENT & PLAN NOTE
Hyperthyroidism due to Graves' disease, on methimazole 10 mg twice a day, however recent TFTs showed patient has developed iatrogenic hyperthyroidism, I did ask Noel to hold methimazole for now, I will repeat TFT in 2 weeks, I repeated TSI as well.  Study showed that hypothyroidism on methimazole is a good prognostic factor to going to remission.  he was instructed to call the office mid next week to give me an update about his symptoms.

## 2024-02-08 NOTE — PROGRESS NOTES
Noel Bird 58 y.o. male MRN: [id number]    Encounter: [id number]      Assessment/Plan     Problem List Items Addressed This Visit          Endocrine    Graves disease - Primary     Hyperthyroidism due to Graves' disease, on methimazole 10 mg twice a day, however recent TFTs showed patient has developed iatrogenic hyperthyroidism, I did ask Noel to hold methimazole for now, I will repeat TFT in 2 weeks, I repeated TSI as well.  Study showed that hypothyroidism on team is a good prognostic factor to going to remission.  She was instructed to call the office mid next week to give me an update about his symptoms.           Relevant Orders    T4, free    TSH, 3rd generation    T3    Thyroid stimulating immunoglobulin    Iatrogenic hypothyroidism     See plan for Graves' disease.              CC:   Hyperthyroidism    History of Present Illness     HPI:  Noel Bird is a 58 year old male who presets for follow up.    He is currently on methimazole 10 mg twice a day and metoprolol 25 mg twice a day.  Most recent TFTs showed free T4 of less than 0.25, TSH of 25.453 and free T4 of 0.7.  He reports feeling fatigue and lack of energy.    Review of Systems   Constitutional:  Positive for fatigue. Negative for appetite change and unexpected weight change.   HENT:  Negative for trouble swallowing and voice change.    Eyes:  Negative for visual disturbance.   Respiratory:  Negative for cough, shortness of breath and wheezing.    Cardiovascular:  Negative for palpitations and leg swelling.   Gastrointestinal:  Negative for abdominal pain, constipation, diarrhea, nausea and vomiting.   Endocrine: Negative for cold intolerance, heat intolerance, polyphagia and polyuria.   Musculoskeletal:  Negative for arthralgias.   Skin:  Negative for color change, rash and wound.   Neurological:  Negative for dizziness, tremors, weakness, light-headedness, numbness and headaches.   Psychiatric/Behavioral:  Negative for agitation and sleep  disturbance. The patient is nervous/anxious.        Historical Information   Past Medical History:   Diagnosis Date    Asthma      History reviewed. No pertinent surgical history.  Social History   Social History     Substance and Sexual Activity   Alcohol Use Never     Social History     Substance and Sexual Activity   Drug Use No     Social History     Tobacco Use   Smoking Status Never   Smokeless Tobacco Never     Family History:   Family History   Problem Relation Age of Onset    Diabetes Mother     Hypertension Mother     Thyroid disease Mother         thyroid disorder    Prostate cancer Father        Meds/Allergies   Current Outpatient Medications   Medication Sig Dispense Refill    albuterol (2.5 mg/3 mL) 0.083 % nebulizer solution Take 1 vial (2.5 mg total) by nebulization every 6 (six) hours as needed for wheezing or shortness of breath (Patient taking differently: Take 2.5 mg by nebulization every 6 (six) hours as needed for wheezing or shortness of breath PRN) 24 vial 0    albuterol (Proventil HFA) 90 mcg/act inhaler Inhale 2 puffs every 6 (six) hours as needed for wheezing (Patient taking differently: Inhale 2 puffs every 6 (six) hours as needed for wheezing PRN) 6.7 g 5    Fluticasone-Salmeterol (Advair) 100-50 mcg/dose inhaler INHALE 1 DOSE BY MOUTH TWICE DAILY (Patient taking differently: PRN) 14 blister 5    loratadine (CLARITIN) 10 mg tablet Take 10 mg by mouth 2 (two) times a day      methimazole (TAPAZOLE) 10 mg tablet Take 1 tablet (10 mg total) by mouth 2 (two) times a day 180 tablet 0    metoprolol tartrate (LOPRESSOR) 25 mg tablet Take 1 tablet (25 mg total) by mouth every 12 (twelve) hours 180 tablet 0    montelukast (SINGULAIR) 10 mg tablet TAKE 1 TABLET BY MOUTH ONCE DAILY AT BEDTIME 30 tablet 5    Multiple Vitamin (MULTIVITAMIN) capsule Take 1 capsule by mouth daily      lansoprazole (PREVACID) 30 mg capsule TAKE 1 CAPSULE (30 MG TOTAL) BY MOUTH DAILY FOR 90 DAYS (Patient not taking:  "Reported on 2/8/2024) 90 capsule 1     No current facility-administered medications for this visit.     No Known Allergies    Objective   Vitals: Blood pressure 140/80, pulse 61, temperature 98.2 °F (36.8 °C), height 5' 3\" (1.6 m), weight 70 kg (154 lb 6.4 oz), SpO2 98%.    Physical Exam  Constitutional:       General: He is not in acute distress.     Appearance: Normal appearance. He is not ill-appearing, toxic-appearing or diaphoretic.   HENT:      Head: Normocephalic and atraumatic.   Eyes:      Extraocular Movements: Extraocular movements intact.   Cardiovascular:      Rate and Rhythm: Normal rate and regular rhythm.   Pulmonary:      Effort: Pulmonary effort is normal. No respiratory distress.   Skin:     General: Skin is warm and dry.   Neurological:      Mental Status: He is alert and oriented to person, place, and time.         The history was obtained from the review of the chart, patient.    Lab Results:   Lab Results   Component Value Date/Time    TSH 3RD GENERATON 25.453 (H) 02/06/2024 10:46 AM    TSH 3RD GENERATON <0.010 (L) 10/06/2023 09:16 AM    Free T4 <0.25 (L) 02/06/2024 10:46 AM    Free T4 4.60 (H) 11/02/2023 02:34 PM     Component      Latest Ref Rng 11/1/2023 11/2/2023 2/6/2024   THYROID STIMULATING IMMUNOGLOBULIN      0.00 - 0.55 IU/L 7.33 (H)      Free T4      0.61 - 1.12 ng/dL  4.60 (H)  <0.25 (L)    TSH 3RD GENERATON      0.450 - 4.500 uIU/mL   25.453 (H)    T3, Total      0.9-1.8 ng/mL ng/mL   0.7 (L)       Legend:  (H) High  (L) Low      Imaging Studies:       I have personally reviewed pertinent reports.      Portions of the record may have been created with voice recognition software. Occasional wrong word or \"sound a like\" substitutions may have occurred due to the inherent limitations of voice recognition software. Read the chart carefully and recognize, using context, where substitutions have occurred.    "

## 2024-02-21 ENCOUNTER — APPOINTMENT (OUTPATIENT)
Dept: LAB | Facility: MEDICAL CENTER | Age: 59
End: 2024-02-21
Payer: COMMERCIAL

## 2024-02-21 DIAGNOSIS — E05.00 GRAVES DISEASE: ICD-10-CM

## 2024-02-21 PROCEDURE — 84445 ASSAY OF TSI GLOBULIN: CPT

## 2024-02-21 PROCEDURE — 84443 ASSAY THYROID STIM HORMONE: CPT

## 2024-02-21 PROCEDURE — 84480 ASSAY TRIIODOTHYRONINE (T3): CPT

## 2024-02-21 PROCEDURE — 36415 COLL VENOUS BLD VENIPUNCTURE: CPT

## 2024-02-21 PROCEDURE — 84439 ASSAY OF FREE THYROXINE: CPT

## 2024-02-22 ENCOUNTER — TELEPHONE (OUTPATIENT)
Dept: ENDOCRINOLOGY | Facility: CLINIC | Age: 59
End: 2024-02-22

## 2024-02-22 DIAGNOSIS — E05.00 GRAVES DISEASE: Primary | ICD-10-CM

## 2024-02-22 LAB
T3 SERPL-MCNC: 5.9 NG/ML
T4 FREE SERPL-MCNC: 3.98 NG/DL (ref 0.61–1.12)
TSH SERPL DL<=0.05 MIU/L-ACNC: 0.04 UIU/ML (ref 0.45–4.5)

## 2024-02-22 NOTE — TELEPHONE ENCOUNTER
Pt had blood work redone yesterday and is feeling very ill. They got the numbers back overnight and stated they have returned to similar numbers as they had when first starting.     Has Dr. Pinto viewed the results yet and what would he like the pt to do?

## 2024-02-24 LAB — TSI SER-ACNC: 8.84 IU/L (ref 0–0.55)

## 2024-02-26 ENCOUNTER — TELEPHONE (OUTPATIENT)
Dept: ENDOCRINOLOGY | Facility: CLINIC | Age: 59
End: 2024-02-26

## 2024-02-26 NOTE — TELEPHONE ENCOUNTER
Patient called office and stated that her  restarted taking the methimazole 5mg this week. Patient's wife stated since he restarted taking this medication.     Patient has been having   Heart complication  Itchy back and knee pain.    Patients wife would like a call back at [phone]

## 2024-05-06 ENCOUNTER — APPOINTMENT (OUTPATIENT)
Dept: LAB | Facility: MEDICAL CENTER | Age: 59
End: 2024-05-06
Payer: COMMERCIAL

## 2024-05-06 DIAGNOSIS — E05.00 GRAVES DISEASE: ICD-10-CM

## 2024-05-06 DIAGNOSIS — E05.90 HYPERTHYROIDISM: ICD-10-CM

## 2024-05-06 LAB
T4 FREE SERPL-MCNC: 0.65 NG/DL (ref 0.61–1.12)
TSH SERPL DL<=0.05 MIU/L-ACNC: 2.17 UIU/ML (ref 0.45–4.5)

## 2024-05-06 PROCEDURE — 36415 COLL VENOUS BLD VENIPUNCTURE: CPT

## 2024-05-06 PROCEDURE — 84443 ASSAY THYROID STIM HORMONE: CPT

## 2024-05-06 PROCEDURE — 84439 ASSAY OF FREE THYROXINE: CPT

## 2024-05-06 PROCEDURE — 84445 ASSAY OF TSI GLOBULIN: CPT

## 2024-05-07 DIAGNOSIS — E05.00 GRAVES DISEASE: Primary | ICD-10-CM

## 2024-05-07 RX ORDER — METHIMAZOLE 10 MG/1
10 TABLET ORAL 2 TIMES DAILY
Qty: 180 TABLET | Refills: 1 | Status: SHIPPED | OUTPATIENT
Start: 2024-05-07 | End: 2024-08-05

## 2024-05-08 LAB — TSI SER-ACNC: 5.83 IU/L (ref 0–0.55)

## 2024-05-24 ENCOUNTER — OFFICE VISIT (OUTPATIENT)
Dept: FAMILY MEDICINE CLINIC | Facility: CLINIC | Age: 59
End: 2024-05-24
Payer: COMMERCIAL

## 2024-05-24 VITALS
SYSTOLIC BLOOD PRESSURE: 152 MMHG | OXYGEN SATURATION: 98 % | DIASTOLIC BLOOD PRESSURE: 70 MMHG | WEIGHT: 146.38 LBS | TEMPERATURE: 97.8 F | HEART RATE: 66 BPM | BODY MASS INDEX: 25.93 KG/M2

## 2024-05-24 DIAGNOSIS — Z12.11 SCREEN FOR COLON CANCER: Primary | ICD-10-CM

## 2024-05-24 DIAGNOSIS — J01.40 ACUTE NON-RECURRENT PANSINUSITIS: ICD-10-CM

## 2024-05-24 DIAGNOSIS — E05.00 GRAVES DISEASE: ICD-10-CM

## 2024-05-24 DIAGNOSIS — J45.30 MILD PERSISTENT ASTHMA WITHOUT COMPLICATION: ICD-10-CM

## 2024-05-24 PROCEDURE — 99214 OFFICE O/P EST MOD 30 MIN: CPT | Performed by: FAMILY MEDICINE

## 2024-05-24 RX ORDER — AZITHROMYCIN 250 MG/1
TABLET, FILM COATED ORAL
Qty: 6 TABLET | Refills: 0 | Status: SHIPPED | OUTPATIENT
Start: 2024-05-24 | End: 2024-05-29

## 2024-05-24 RX ORDER — METHYLPREDNISOLONE 4 MG/1
TABLET ORAL
Qty: 21 EACH | Refills: 0 | Status: SHIPPED | OUTPATIENT
Start: 2024-05-24

## 2024-05-24 NOTE — PROGRESS NOTES
Ambulatory Visit  Name: Noel Bird      : 1965      MRN: [id number]  Encounter Provider: Alan Haynes MD  Encounter Date: 2024   Encounter department: [facility]    Assessment & Plan   1. Screen for colon cancer  -     Ambulatory Referral to Gastroenterology; Future  2. Mild persistent asthma without complication  -     methylPREDNISolone 4 MG tablet therapy pack; Use as directed on package  3. Graves disease  4. Acute non-recurrent pansinusitis  -     azithromycin (ZITHROMAX) 250 mg tablet; Take 2 tablets on day 1, then 1 tablet daily days 2 through 5       Chief Complaint   Patient presents with   • Cough   • Nasal Congestion     X 5 days. Pt states he did an at home covid test yesterday and the result was neg.  mgb       History of Present Illness     Patient is here because he is having increasing symptoms, possibly consistent with asthma.  Has been having increased cough and nasal congestion.  Increased amount of mucus production and phlegm.  Poor sleep with this secondary to the postnasal drip.  Some facial pain, some tooth pain initially, but that seems to be better.    No medication so far.  Using his regular medications for his asthma.    As far as his asthma is concerned, he has been using the Advair, Singulair, as needed albuterol.  Prior to this episode, he was doing extremely well, and really did not have much of a problem at all.    Graves' disease: Following with endocrinology.  Currently on Tapazole, metoprolol.          Review of Systems   Constitutional:  Positive for activity change and fatigue.   HENT:  Positive for congestion, dental problem, postnasal drip, rhinorrhea, sinus pressure, sinus pain and sore throat.    Respiratory:  Positive for cough (Especially at night and with increasing postnasal drip) and wheezing (Yesterday noted some wheezing).        Objective     /70 (BP Location: Left arm, Patient Position: Sitting, Cuff Size: Large)    Pulse 66   Temp 97.8 °F (36.6 °C) (Temporal)   Wt 66.4 kg (146 lb 6 oz)   SpO2 98%   BMI 25.93 kg/m²     Physical Exam  Administrative Statements

## 2024-05-24 NOTE — PATIENT INSTRUCTIONS
1. Screen for colon cancer  -     Ambulatory Referral to Gastroenterology; Future  2. Mild persistent asthma without complication  -     methylPREDNISolone 4 MG tablet therapy pack; Use as directed on package  3. Graves disease  4. Acute non-recurrent pansinusitis  -     azithromycin (ZITHROMAX) 250 mg tablet; Take 2 tablets on day 1, then 1 tablet daily days 2 through 5

## 2024-06-03 ENCOUNTER — RA CDI HCC (OUTPATIENT)
Dept: OTHER | Facility: HOSPITAL | Age: 59
End: 2024-06-03

## 2024-07-26 DIAGNOSIS — E05.00 GRAVES DISEASE: ICD-10-CM

## 2024-07-26 RX ORDER — METHIMAZOLE 10 MG/1
10 TABLET ORAL 2 TIMES DAILY
Qty: 180 TABLET | Refills: 0 | Status: SHIPPED | OUTPATIENT
Start: 2024-07-26 | End: 2024-10-24

## 2024-08-05 ENCOUNTER — TELEMEDICINE (OUTPATIENT)
Dept: FAMILY MEDICINE CLINIC | Facility: CLINIC | Age: 59
End: 2024-08-05
Payer: COMMERCIAL

## 2024-08-05 DIAGNOSIS — J01.40 ACUTE NON-RECURRENT PANSINUSITIS: Primary | ICD-10-CM

## 2024-08-05 DIAGNOSIS — J45.31 MILD PERSISTENT ASTHMA WITH ACUTE EXACERBATION: ICD-10-CM

## 2024-08-05 PROCEDURE — 99214 OFFICE O/P EST MOD 30 MIN: CPT | Performed by: NURSE PRACTITIONER

## 2024-08-05 RX ORDER — SULFAMETHOXAZOLE AND TRIMETHOPRIM 800; 160 MG/1; MG/1
1 TABLET ORAL EVERY 12 HOURS SCHEDULED
Qty: 20 TABLET | Refills: 0 | Status: SHIPPED | OUTPATIENT
Start: 2024-08-05 | End: 2024-08-15

## 2024-08-05 RX ORDER — PREDNISONE 10 MG/1
TABLET ORAL
Qty: 30 TABLET | Refills: 0 | Status: SHIPPED | OUTPATIENT
Start: 2024-08-05

## 2024-08-05 NOTE — PROGRESS NOTES
Virtual Regular Visit  Name: Noel Bird      : 1965      MRN: [id number]  Encounter Provider: OTIS Abarca  Encounter Date: 2024   Encounter department: [facility]    Verification of patient location:    Patient is located at Home in the following state in which I hold an active license [place]    Assessment & Plan   1. Acute non-recurrent pansinusitis  Assessment & Plan:  10-day course of Bactrim and prednisone taper for treatment of acute sinusitis.  Orders:  -     sulfamethoxazole-trimethoprim (BACTRIM DS) 800-160 mg per tablet; Take 1 tablet by mouth every 12 (twelve) hours for 10 days  -     predniSONE 10 mg tablet; Use 5 tablets for 2 days. Use 4 tablets for 2 days. Use 3 tablets for 2 days. Use 2 tablets for 2 days. Use 1 tablet for 2 days.  2. Mild persistent asthma with acute exacerbation  Assessment & Plan:  Prednisone taper was ordered to help with acute airway inflammation.  Patient was also advised to use inhalers as needed.  Orders:  -     predniSONE 10 mg tablet; Use 5 tablets for 2 days. Use 4 tablets for 2 days. Use 3 tablets for 2 days. Use 2 tablets for 2 days. Use 1 tablet for 2 days.       Encounter provider OTIS Abarca    The patient was identified by name and date of birth. Noel Bird was informed that this is a telemedicine visit and that the visit is being conducted through the Epic Embedded platform. He agrees to proceed..  My office door was closed. No one else was in the room.  He acknowledged consent and understanding of privacy and security of the video platform. The patient has agreed to participate and understands they can discontinue the visit at any time.    Patient is aware this is a billable service.     History of Present Illness     Sinusitis: Patient reports over the past 4 days she has been experiencing symptoms of sore throat, sinus pressure and congestion, headache, rhinorrhea, nasal congestion, chills, fevers,  bilateral ear congestion, and dental pressure and pain.  Patient did complete a home COVID test earlier today which was negative.    Asthma: Patient is currently managed on Advair as needed, albuterol inhaler as needed, and Singulair 10 mg at bedtime.  The patient reports that he has been noting some increased dyspnea on exertion since his symptoms began but it has not been severe enough that he has needed to use his inhaler.        Review of Systems   Constitutional:  Positive for chills and fever.   HENT:  Positive for congestion, dental problem (dental pressure and pain), postnasal drip, rhinorrhea, sinus pressure, sinus pain and sore throat. Negative for ear pain.         Bilateral ear congestion   Eyes:  Negative for pain and visual disturbance.   Respiratory:  Positive for chest tightness and shortness of breath (DEAN). Negative for cough and wheezing.    Cardiovascular:  Negative for chest pain, palpitations and leg swelling.   Gastrointestinal:  Negative for abdominal pain, constipation, diarrhea, nausea and vomiting.   Endocrine: Negative for cold intolerance and heat intolerance.   Genitourinary:  Negative for decreased urine volume, dysuria and hematuria.   Musculoskeletal:  Negative for arthralgias, back pain and myalgias.   Skin:  Negative for color change and rash.   Allergic/Immunologic: Negative for environmental allergies.   Neurological:  Positive for headaches. Negative for dizziness, seizures, syncope, weakness, light-headedness and numbness.   Hematological:  Negative for adenopathy.   Psychiatric/Behavioral:  Negative for confusion. The patient is not nervous/anxious.    All other systems reviewed and are negative.      Objective     There were no vitals taken for this visit.  Physical Exam  Vitals (limited due to video visit) and nursing note reviewed.   Constitutional:       General: He is not in acute distress.     Appearance: Normal appearance. He is not ill-appearing.   Neurological:       Mental Status: He is alert.         Visit Time  Total Visit Duration: 15 minutes

## 2024-08-05 NOTE — ASSESSMENT & PLAN NOTE
Prednisone taper was ordered to help with acute airway inflammation.  Patient was also advised to use inhalers as needed.

## 2024-08-10 ENCOUNTER — NURSE TRIAGE (OUTPATIENT)
Dept: OTHER | Facility: OTHER | Age: 59
End: 2024-08-10

## 2024-08-10 NOTE — TELEPHONE ENCOUNTER
On-call provider paged. Recommended continuing Metoprolol at current dose. Monitor blood pressure and heart rate over the next few days. Advised to cut dose in half if blood pressure is below 100/60. May have abs done as ordered on Monday pior to follow up visit. Pt wife verbalized understanding.

## 2024-08-10 NOTE — TELEPHONE ENCOUNTER
"Regarding: metoprolol medication questions/hand tremors/hiccups/agitated  ----- Message from Ariana ALCOCER sent at 8/10/2024 11:33 AM EDT -----  Pt's spouse stated, \"Noel told me he stopped taking his Metoprolol for three days because he ran out. He has had hiccups, hand tremors and is very agitated. Could this be from stopping the medication? I am picking up a new prescription today. Can he just start taking it again?\"    "

## 2024-08-10 NOTE — TELEPHONE ENCOUNTER
"Reason for Disposition  • [1] Caller has URGENT medicine question about med that PCP or specialist prescribed AND [2] triager unable to answer question    Answer Assessment - Initial Assessment Questions  1. NAME of MEDICATION: \"What medicine are you calling about?\"      metoprolol tartrate (LOPRESSOR) 25 mg tablet    2. QUESTION: \"What is your question?\" (e.g., medication refill, side effect)      Patient hasn't been taking Metoprolol since Wednesday. He has been forgetting to take medication per wife, Yenifer. Pt has been getting hiccups, hand tremors, agitation, insomnia. Would like to make sure it's okay to restart Metoprolol today?    3. PRESCRIBING HCP: \"Who prescribed it?\" Reason: if prescribed by specialist, call should be referred to that group.      Chi Pinto MD    4. SYMPTOMS: \"Do you have any symptoms?\"      Hiccups, hand tremors, agitation, insomnia, fatigue    5. SEVERITY: If symptoms are present, ask \"Are they mild, moderate or severe?\"      Moderate-severe    Protocols used: Medication Question Call-ADULT-AH    "

## 2024-09-04 PROBLEM — J01.40 ACUTE NON-RECURRENT PANSINUSITIS: Status: RESOLVED | Noted: 2024-08-05 | Resolved: 2024-09-04

## 2024-10-02 ENCOUNTER — OFFICE VISIT (OUTPATIENT)
Dept: FAMILY MEDICINE CLINIC | Facility: CLINIC | Age: 59
End: 2024-10-02
Payer: COMMERCIAL

## 2024-10-02 VITALS
WEIGHT: 154 LBS | DIASTOLIC BLOOD PRESSURE: 86 MMHG | OXYGEN SATURATION: 98 % | TEMPERATURE: 99.9 F | RESPIRATION RATE: 16 BRPM | BODY MASS INDEX: 27.29 KG/M2 | HEART RATE: 58 BPM | SYSTOLIC BLOOD PRESSURE: 130 MMHG | HEIGHT: 63 IN

## 2024-10-02 DIAGNOSIS — J45.20 MILD INTERMITTENT ASTHMA WITHOUT COMPLICATION: ICD-10-CM

## 2024-10-02 DIAGNOSIS — E05.00 GRAVES DISEASE: ICD-10-CM

## 2024-10-02 DIAGNOSIS — J01.40 ACUTE NON-RECURRENT PANSINUSITIS: Primary | ICD-10-CM

## 2024-10-02 DIAGNOSIS — K21.9 LARYNGOPHARYNGEAL REFLUX (LPR): ICD-10-CM

## 2024-10-02 LAB
SARS-COV-2 AG UPPER RESP QL IA: NEGATIVE
SL AMB POCT RAPID FLU A: NEGATIVE
SL AMB POCT RAPID FLU B: NEGATIVE
VALID CONTROL: NORMAL

## 2024-10-02 PROCEDURE — 99214 OFFICE O/P EST MOD 30 MIN: CPT | Performed by: FAMILY MEDICINE

## 2024-10-02 PROCEDURE — 87804 INFLUENZA ASSAY W/OPTIC: CPT | Performed by: FAMILY MEDICINE

## 2024-10-02 PROCEDURE — 87811 SARS-COV-2 COVID19 W/OPTIC: CPT | Performed by: FAMILY MEDICINE

## 2024-10-02 RX ORDER — AMOXICILLIN 875 MG
875 TABLET ORAL 2 TIMES DAILY
Qty: 20 TABLET | Refills: 0 | Status: SHIPPED | OUTPATIENT
Start: 2024-10-02 | End: 2024-10-12

## 2024-10-02 NOTE — ASSESSMENT & PLAN NOTE
Stable at the moment.  Reviewed labs from May.  Recommend follow-up with endocrinology.  Continues on methimazole.

## 2024-10-02 NOTE — PATIENT INSTRUCTIONS
1. Acute non-recurrent pansinusitis  Comments:  Likely sinus infection.  Amoxicillin.  Symptomatic treatment otherwise.  Orders:  -     POCT rapid flu A and B  -     POCT Rapid Covid Ag  -     amoxicillin (AMOXIL) 875 mg tablet; Take 1 tablet (875 mg total) by mouth 2 (two) times a day for 10 days  2. Graves disease  Assessment & Plan:  Stable at the moment.  Reviewed labs from May.  Recommend follow-up with endocrinology.  Continues on methimazole.         3. Mild intermittent asthma without complication  Assessment & Plan:  Stable.  Not using inhalers at the moment.  No changes needed based on that.         4. Laryngopharyngeal reflux (LPR)  Assessment & Plan:  Patient continues on Prevacid.  No change.  That does seem to help him quite a bit.             COVID 19 Instructions    Noel Bird was advised to limit contact with others to essential tasks such as getting food, medications, and medical care.    Proper handwashing reviewed, and Hand sanitzer when washing is not available.    If the patient develops symptoms of COVID 19, the patient should call the office as soon as possible.    It is strongly recommended that Flu Vaccinations be obtained.      Virtual Visits:  Roberto: This works on smart phones (any phone with Internet browsing capability).  You should get a text message when the provider is ready to see you.  Click on the link in the text message, and the call should start.  You will need to type in your name, and allow camera and microphone access.  This is HIPPA compliant, and secure.      If you have not already done so, get immunized to COVID 19.      We are committed to getting you vaccinated as soon as possible and will be closely following CDC and [facility] guidelines as they are released and revised.  Please refer to our COVID-19 vaccine webpage for the most up to date information on the vaccine and our distribution efforts.    This site will also have the most up to date  recommendations for COVID booster vaccine.    [email or web address]    Call [phone] ([phone]), option 7    You can also visit https://www.vaccines.gov/ to find vaccines in your area.    OUR LOCATION:    [facility]  [address] [place] [address], [address]  [place], [place], [address] [phone]  Fax: [phone]    Lab services, Rheumatology, and OB/GYN are at this location as well.

## 2024-10-02 NOTE — PROGRESS NOTES
Ambulatory Visit  Name: Noel Bird      : 1965      MRN: [id number]  Encounter Provider: Alan Haynes MD  Encounter Date: 10/2/2024   Encounter department: [facility]    Assessment & Plan  Acute non-recurrent pansinusitis    Orders:    POCT rapid flu A and B    POCT Rapid Covid Ag    amoxicillin (AMOXIL) 875 mg tablet; Take 1 tablet (875 mg total) by mouth 2 (two) times a day for 10 days    Graves disease  Stable at the moment.  Reviewed labs from May.  Recommend follow-up with endocrinology.  Continues on methimazole.         Mild intermittent asthma without complication  Stable.  Not using inhalers at the moment.  No changes needed based on that.         Laryngopharyngeal reflux (LPR)  Patient continues on Prevacid.  No change.  That does seem to help him quite a bit.              1. Acute non-recurrent pansinusitis  Comments:  Likely sinus infection.  Amoxicillin.  Symptomatic treatment otherwise.  Orders:  -     POCT rapid flu A and B  -     POCT Rapid Covid Ag  -     amoxicillin (AMOXIL) 875 mg tablet; Take 1 tablet (875 mg total) by mouth 2 (two) times a day for 10 days  2. Graves disease  Assessment & Plan:  Stable at the moment.  Reviewed labs from May.  Recommend follow-up with endocrinology.  Continues on methimazole.  3. Mild intermittent asthma without complication  Assessment & Plan:  Stable.  Not using inhalers at the moment.  No changes needed based on that.  4. Laryngopharyngeal reflux (LPR)  Assessment & Plan:  Patient continues on Prevacid.  No change.  That does seem to help him quite a bit.      Chief Complaint   Patient presents with    Headache    Cough    Nasal Congestion     Per pt tested yesterday for covid negative.         History of Present Illness     Patient is here because he is ill.  Fatigued, congestion, headaches, some shortness of breath.  Did test at home for COVID.  Rapid COVID and influenza testing in the office today was  "negative.  Occasional tooth pain, but not necessarily related to this.  Does have pressure in the face with looking down.  Postnasal drip definitely with this.    No medications up to this point.    Graves' disease: Patient is following with endocrinology.  Currently on methimazole.  Seems to be working well.  Reviewed blood work from May.  He does have follow-up coming up with Dr. Thomas.    Asthma: Again, not really having significant issue at the moment as far as shortness of breath.  Has not needed to use Advair, again not needing albuterol much either.        Fever - 9 weeks to 74 years   This is a new problem. The current episode started in the past 7 days. The problem occurs rarely. The problem has been resolved. The maximum temperature noted was 100 to 100.9 F. The temperature was taken using a tympanic thermometer. Associated symptoms include congestion, coughing, headaches and a sore throat.   Risk factors: hx of cancer, occupational exposure and recent sickness    Risk factors: no contaminated food, no contaminated water, no immunosuppression, no recent travel and no sick contacts          Review of Systems   Constitutional:  Positive for fever.   HENT:  Positive for congestion and sore throat.    Respiratory:  Positive for cough.    Neurological:  Positive for headaches.           Objective     /86 (BP Location: Left arm, Patient Position: Sitting, Cuff Size: Standard)   Pulse 58   Temp 99.9 °F (37.7 °C) (Tympanic)   Resp 16   Ht 5' 3\" (1.6 m)   Wt 69.9 kg (154 lb)   SpO2 98%   BMI 27.28 kg/m²     Physical Exam  Vitals and nursing note reviewed.   Constitutional:       Appearance: Normal appearance. He is well-developed.   HENT:      Head: Normocephalic and atraumatic.   Cardiovascular:      Rate and Rhythm: Normal rate and regular rhythm.      Pulses:           Carotid pulses are 2+ on the right side and 2+ on the left side.     Heart sounds: Normal heart sounds. No murmur heard.     No " friction rub. No gallop.   Pulmonary:      Effort: Pulmonary effort is normal. No respiratory distress.      Breath sounds: Normal breath sounds. No wheezing or rales.   Musculoskeletal:      Cervical back: Normal range of motion and neck supple.   Lymphadenopathy:      Cervical: No cervical adenopathy.   Neurological:      Mental Status: He is alert.

## 2024-10-29 ENCOUNTER — TELEMEDICINE (OUTPATIENT)
Dept: FAMILY MEDICINE CLINIC | Facility: CLINIC | Age: 59
End: 2024-10-29
Payer: COMMERCIAL

## 2024-10-29 VITALS — BODY MASS INDEX: 27.29 KG/M2 | WEIGHT: 154 LBS | HEIGHT: 63 IN

## 2024-10-29 DIAGNOSIS — H65.03 NON-RECURRENT ACUTE SEROUS OTITIS MEDIA OF BOTH EARS: Primary | ICD-10-CM

## 2024-10-29 DIAGNOSIS — Z12.11 SCREENING FOR COLON CANCER: ICD-10-CM

## 2024-10-29 DIAGNOSIS — J45.30 MILD PERSISTENT ASTHMA WITHOUT COMPLICATION: ICD-10-CM

## 2024-10-29 DIAGNOSIS — J01.40 ACUTE NON-RECURRENT PANSINUSITIS: ICD-10-CM

## 2024-10-29 PROCEDURE — 99213 OFFICE O/P EST LOW 20 MIN: CPT | Performed by: FAMILY MEDICINE

## 2024-10-29 RX ORDER — FLUTICASONE PROPIONATE AND SALMETEROL 100; 50 UG/1; UG/1
1 POWDER RESPIRATORY (INHALATION) 2 TIMES DAILY
Qty: 60 BLISTER | Refills: 5 | Status: SHIPPED | OUTPATIENT
Start: 2024-10-29

## 2024-10-29 RX ORDER — ALBUTEROL SULFATE 90 UG/1
2 INHALANT RESPIRATORY (INHALATION) EVERY 6 HOURS PRN
Qty: 6.7 G | Refills: 5 | Status: SHIPPED | OUTPATIENT
Start: 2024-10-29

## 2024-10-29 RX ORDER — CEFUROXIME AXETIL 500 MG/1
500 TABLET ORAL EVERY 12 HOURS SCHEDULED
Qty: 20 TABLET | Refills: 0 | Status: SHIPPED | OUTPATIENT
Start: 2024-10-29 | End: 2024-11-07 | Stop reason: ALTCHOICE

## 2024-10-29 NOTE — ASSESSMENT & PLAN NOTE
Orders:    albuterol (Proventil HFA) 90 mcg/act inhaler; Inhale 2 puffs every 6 (six) hours as needed for wheezing    Fluticasone-Salmeterol (Advair) 100-50 mcg/dose inhaler; Inhale 1 puff 2 (two) times a day Rinse mouth after use.

## 2024-10-29 NOTE — PATIENT INSTRUCTIONS
1. Non-recurrent acute serous otitis media of both ears  Comments:  Patient appears to have otitis media based on his symptoms.  Would recommend antibiotics.  Side effects of Bactrim before, Ceftin.  Orders:  -     cefuroxime (CEFTIN) 500 mg tablet; Take 1 tablet (500 mg total) by mouth every 12 (twelve) hours for 10 days  2. Acute non-recurrent pansinusitis  Comments:  Times consistent with sinusitis.  Unable to fully examine.  Ceftin.  Orders:  -     cefuroxime (CEFTIN) 500 mg tablet; Take 1 tablet (500 mg total) by mouth every 12 (twelve) hours for 10 days  3. Mild persistent asthma without complication  Assessment & Plan:  Patient does have history of and asthma.  Has been doing relatively well.  Does need albuterol to be available.  Also, he does have Advair, but has not been sure if he has it at home.  Would recommend sending the Advair to the pharmacy.  This way if he needs the Advair, he would have it available.  Orders:  -     albuterol (Proventil HFA) 90 mcg/act inhaler; Inhale 2 puffs every 6 (six) hours as needed for wheezing  -     Fluticasone-Salmeterol (Advair) 100-50 mcg/dose inhaler; Inhale 1 puff 2 (two) times a day Rinse mouth after use.  4. Screening for colon cancer  Comments:  Recommend colonoscopy for screening.  Has not had prior.  Orders:  -     Ambulatory Referral to Gastroenterology; Future      COVID 19 Instructions    Noel Bird was advised to limit contact with others to essential tasks such as getting food, medications, and medical care.    Proper handwashing reviewed, and Hand sanitzer when washing is not available.    If the patient develops symptoms of COVID 19, the patient should call the office as soon as possible.    It is strongly recommended that Flu Vaccinations be obtained.      Virtual Visits:  AmWell: This works on smart phones (any phone with Internet browsing capability).  You should get a text message when the provider is ready to see you.  Click on the link in the  text message, and the call should start.  You will need to type in your name, and allow camera and microphone access.  This is HIPPA compliant, and secure.      If you have not already done so, get immunized to COVID 19.      We are committed to getting you vaccinated as soon as possible and will be closely following [facility] guidelines as they are released and revised.  Please refer to our COVID-19 vaccine webpage for the most up to date information on the vaccine and our distribution efforts.    This site will also have the most up to date recommendations for COVID booster vaccine.    [email or web address]    Call [phone] ([phone]), option 7    You can also visit https://www.vaccines.gov/ to find vaccines in your area.    OUR LOCATION:    [facility]  [address] [place] [address], [address]  [place], [place], [address] [phone]  Fax: [phone]    Lab services, Rheumatology, and OB/GYN are at this location as well.  Colorectal Cancer Screening    There are approximately 150,000 new cases of colorectal cancer diagnosed anually in the [place]    Approximately 53,000 Americans die of colon cancer every year    Colorectal cancer causes 8% of all cancer related deaths in the [place]. That is almost 1 out of every 10 deaths from all cancers.    The incidence of colorectal cancer in people under the age of 50 has steadily increased at a rate of 2 percent per year from 1995 through 2018    The overall death rate from colorectal cancer have declined overall since the mid-1980s in the [place] and this is partially attributed to increased screening    Screening Procedures    There are multiple ways to screen for colorectal cancer which include:  Colonoscopy  CT Colonography (virtual colonoscopy)  Sigmoidoscopy  Fecal Immunochemical Test (FIT)  DNA Stool Test (cologuard)  High Sensitivity Fecal Occult Blood Test (FOBT)    Colonoscopy remains the  gold standard in regards to screening for colorectal cancer. Generally during a colonoscopy, sedation is used to make you comfortable. Then a scope is used to look in the colon to evaluate for evidence of colon polyps or evidence of cancer.     Risk Factors    Outside of a genetic predisposition for colorectal cancer, family history is the second most important risk factor. Having a single affected first-degree relative (parent, sibling, or child) with colorectal cancer increases the risk approximately two-fold over that of the general population  Overweight/Obesity  Diabetes mellitus and insulin resistance   Tobacco  Alcohol  Lack of regular physical activity  Diet low in fruits/vegetables, high in processed foods, low in fiber, and/or high in fat    Signs/Symptoms    Change in bowel habits  Diarrhea or constipation persisting longer than 4 weeks  Rectal bleeding  Prolonged abdominal discomfort  Weakness or fatigue  Weight loss    Early detection is BEST!  Schedule your colonoscopy TODAY if you are 45 years or older!

## 2024-10-29 NOTE — PROGRESS NOTES
Virtual Regular Visit  Name: Noel Bird      : 1965      MRN: [id number]  Encounter Provider: Alan Haynes MD  Encounter Date: 10/29/2024   Encounter department: [facility]    Verification of patient location:    Patient is located at Home in the following state in which I hold an active license [place]    Assessment & Plan  Non-recurrent acute serous otitis media of both ears    Orders:  •  cefuroxime (CEFTIN) 500 mg tablet; Take 1 tablet (500 mg total) by mouth every 12 (twelve) hours for 10 days    Acute non-recurrent pansinusitis    Orders:  •  cefuroxime (CEFTIN) 500 mg tablet; Take 1 tablet (500 mg total) by mouth every 12 (twelve) hours for 10 days    Mild persistent asthma without complication    Orders:  •  albuterol (Proventil HFA) 90 mcg/act inhaler; Inhale 2 puffs every 6 (six) hours as needed for wheezing  •  Fluticasone-Salmeterol (Advair) 100-50 mcg/dose inhaler; Inhale 1 puff 2 (two) times a day Rinse mouth after use.    Screening for colon cancer    Orders:  •  Ambulatory Referral to Gastroenterology; Future           1. Non-recurrent acute serous otitis media of both ears  Comments:  Patient appears to have otitis media based on his symptoms.  Would recommend antibiotics.  Side effects of Bactrim before, Ceftin.  Orders:  -     cefuroxime (CEFTIN) 500 mg tablet; Take 1 tablet (500 mg total) by mouth every 12 (twelve) hours for 10 days  2. Acute non-recurrent pansinusitis  Comments:  Times consistent with sinusitis.  Unable to fully examine.  Ceftin.  Orders:  -     cefuroxime (CEFTIN) 500 mg tablet; Take 1 tablet (500 mg total) by mouth every 12 (twelve) hours for 10 days  3. Mild persistent asthma without complication  Assessment & Plan:    Orders:  •  albuterol (Proventil HFA) 90 mcg/act inhaler; Inhale 2 puffs every 6 (six) hours as needed for wheezing  •  Fluticasone-Salmeterol (Advair) 100-50 mcg/dose inhaler; Inhale 1 puff 2 (two) times a day Rinse mouth  after use.    Orders:  -     albuterol (Proventil HFA) 90 mcg/act inhaler; Inhale 2 puffs every 6 (six) hours as needed for wheezing  -     Fluticasone-Salmeterol (Advair) 100-50 mcg/dose inhaler; Inhale 1 puff 2 (two) times a day Rinse mouth after use.  4. Screening for colon cancer  Comments:  Recommend colonoscopy for screening.  Has not had prior.  Orders:  -     Ambulatory Referral to Gastroenterology; Future      Chief Complaint   Patient presents with   • Virtual Regular Visit     Negative at home Covid test. Cold symptoms onset a couple of days.         Encounter provider Alan Haynes MD    The patient was identified by name and date of birth. Noel Bird was informed that this is a telemedicine visit and that the visit is being conducted through the Epic Embedded platform. He agrees to proceed..  My office door was closed. No one else was in the room.  He acknowledged consent and understanding of privacy and security of the video platform. The patient has agreed to participate and understands they can discontinue the visit at any time.    Patient is aware this is a billable service.     History of Present Illness     HA, Pressure in ears, conjestion.  Some tooth pain.  Post nasal drip.  Has been since Friday, getting worse.  No wheezes to be concerned about.  Has albuterol available.    Wife recently ill as well.        Sore Throat   This is a recurrent problem. The current episode started in the past 7 days. The problem has been waxing and waning. The pain is worse on the right side. There has been no fever. The pain is at a severity of 3/10. The pain is mild. Associated symptoms include congestion, headaches and a plugged ear sensation. Pertinent negatives include no abdominal pain, coughing, diarrhea, ear pain (Ear clogged), shortness of breath, trouble swallowing or vomiting.         Review of Systems   Constitutional:  Negative for appetite change, chills, diaphoresis, fatigue and fever.  "  HENT:  Positive for congestion, postnasal drip, sinus pressure, sinus pain and sore throat. Negative for ear pain (Ear clogged), facial swelling, rhinorrhea, trouble swallowing and voice change.    Respiratory:  Negative for cough, chest tightness, shortness of breath and wheezing.    Cardiovascular:  Negative for chest pain.   Gastrointestinal:  Negative for abdominal pain, diarrhea, nausea and vomiting.   Musculoskeletal:  Negative for myalgias.   Neurological:  Positive for headaches.           Objective     Ht 5' 3\" (1.6 m)   Wt 69.9 kg (154 lb)   BMI 27.28 kg/m²   Physical Exam  Nursing note reviewed.   Constitutional:       Appearance: He is well-developed.   HENT:      Head: Normocephalic and atraumatic.   Pulmonary:      Effort: Pulmonary effort is normal.      Breath sounds: Normal breath sounds.       Visit Time  Total Visit Duration: 20          "

## 2024-11-03 DIAGNOSIS — E05.90 HYPERTHYROIDISM: ICD-10-CM

## 2024-11-03 DIAGNOSIS — E05.00 GRAVES DISEASE: ICD-10-CM

## 2024-11-04 DIAGNOSIS — J45.41 MODERATE PERSISTENT ASTHMA WITH EXACERBATION: ICD-10-CM

## 2024-11-04 RX ORDER — PREDNISONE 10 MG/1
TABLET ORAL
Qty: 50 TABLET | Refills: 0 | Status: SHIPPED | OUTPATIENT
Start: 2024-11-04

## 2024-11-04 RX ORDER — ALBUTEROL SULFATE 0.83 MG/ML
2.5 SOLUTION RESPIRATORY (INHALATION) EVERY 6 HOURS PRN
Status: CANCELLED | OUTPATIENT
Start: 2024-11-04

## 2024-11-04 RX ORDER — METOPROLOL TARTRATE 25 MG/1
25 TABLET, FILM COATED ORAL EVERY 12 HOURS SCHEDULED
Qty: 180 TABLET | Refills: 1 | Status: SHIPPED | OUTPATIENT
Start: 2024-11-04 | End: 2025-02-02

## 2024-11-04 RX ORDER — ALBUTEROL SULFATE 0.83 MG/ML
2.5 SOLUTION RESPIRATORY (INHALATION) EVERY 4 HOURS PRN
Qty: 540 ML | Refills: 1 | Status: SHIPPED | OUTPATIENT
Start: 2024-11-04

## 2024-11-07 ENCOUNTER — OFFICE VISIT (OUTPATIENT)
Dept: FAMILY MEDICINE CLINIC | Facility: CLINIC | Age: 59
End: 2024-11-07
Payer: COMMERCIAL

## 2024-11-07 VITALS
WEIGHT: 152 LBS | OXYGEN SATURATION: 98 % | SYSTOLIC BLOOD PRESSURE: 120 MMHG | DIASTOLIC BLOOD PRESSURE: 90 MMHG | HEART RATE: 92 BPM | HEIGHT: 63 IN | BODY MASS INDEX: 26.93 KG/M2

## 2024-11-07 DIAGNOSIS — H65.03 NON-RECURRENT ACUTE SEROUS OTITIS MEDIA OF BOTH EARS: ICD-10-CM

## 2024-11-07 DIAGNOSIS — J45.21 MILD INTERMITTENT ASTHMA WITH ACUTE EXACERBATION: Primary | ICD-10-CM

## 2024-11-07 PROCEDURE — 99213 OFFICE O/P EST LOW 20 MIN: CPT | Performed by: FAMILY MEDICINE

## 2024-11-07 RX ORDER — FLUTICASONE PROPIONATE 50 MCG
2 SPRAY, SUSPENSION (ML) NASAL DAILY
Qty: 15.8 ML | Refills: 2 | Status: SHIPPED | OUTPATIENT
Start: 2024-11-07

## 2024-11-07 NOTE — ASSESSMENT & PLAN NOTE
See previous HPI. Asthma seems to be doing well with his current exacerbation.  He is currently using steroids, however.  Continue to encourage him to use the albuterol as he is currently doing.  If he has increasing problems, would add back the Advair as well.  It is preferable to use the controller meds to needing to use the steroids as we have discussed previously.    This is his first exacerbation in quite a while.  Again, once he is better with this, return to as needed use of medications.

## 2024-11-07 NOTE — PATIENT INSTRUCTIONS
1. Mild intermittent asthma with acute exacerbation  Assessment & Plan:  Asthma seems to be doing well with his current exacerbation.  He is currently using steroids, however.  Continue to encourage him to use the albuterol as he is currently doing.  If he has increasing problems, would add back the Advair as well.  It is preferable to use the controller meds to needing to use the steroids as we have discussed previously.    This is his first exacerbation in quite a while.  Again, once he is better with this, return to as needed use of medications.         2. Non-recurrent acute serous otitis media of both ears  Comments:  Continued dysfunction of the right canal.  2 pills Ceftin left.  Trial Augmentin.  Possible eustachian tube dysfunction as well.  Flonase recommended.  Orders:  -     amoxicillin-clavulanate (AUGMENTIN) 875-125 mg per tablet; Take 1 tablet by mouth every 12 (twelve) hours for 10 days  -     fluticasone (FLONASE) 50 mcg/act nasal spray; 2 sprays into each nostril daily      COVID 19 Instructions    Noel Bird was advised to limit contact with others to essential tasks such as getting food, medications, and medical care.    Proper handwashing reviewed, and Hand sanitzer when washing is not available.    If the patient develops symptoms of COVID 19, the patient should call the office as soon as possible.    It is strongly recommended that Flu Vaccinations be obtained.      Virtual Visits:  Roberto: This works on smart phones (any phone with Internet browsing capability).  You should get a text message when the provider is ready to see you.  Click on the link in the text message, and the call should start.  You will need to type in your name, and allow camera and microphone access.  This is HIPPA compliant, and secure.      If you have not already done so, get immunized to COVID 19.      We are committed to getting you vaccinated as soon as possible and will be closely following [facility] and [place]  [facility] guidelines as they are released and revised.  Please refer to our COVID-19 vaccine webpage for the most up to date information on the vaccine and our distribution efforts.    This site will also have the most up to date recommendations for COVID booster vaccine.    [email or web address]-vaccine    Call [phone] ([phone]), option 7    You can also visit https://www.vaccines.gov/ to find vaccines in your area.    OUR LOCATION:    [facility]  [address] [place] [address], [address]  [place], [address] [phone]  Fax: [phone]    Lab services, Rheumatology, and OB/GYN are at this location as well.

## 2024-11-07 NOTE — PROGRESS NOTES
Ambulatory Visit  Name: Noel Bird      : 1965      MRN: [id number]  Encounter Provider: Alan Haynes MD  Encounter Date: 2024   Encounter department: [facility]    Assessment & Plan  Mild intermittent asthma with acute exacerbation  Asthma seems to be doing well with his current exacerbation.  He is currently using steroids, however.  Continue to encourage him to use the albuterol as he is currently doing.  If he has increasing problems, would add back the Advair as well.  It is preferable to use the controller meds to needing to use the steroids as we have discussed previously.    This is his first exacerbation in quite a while.  Again, once he is better with this, return to as needed use of medications.         Non-recurrent acute serous otitis media of both ears    Orders:    amoxicillin-clavulanate (AUGMENTIN) 875-125 mg per tablet; Take 1 tablet by mouth every 12 (twelve) hours for 10 days    fluticasone (FLONASE) 50 mcg/act nasal spray; 2 sprays into each nostril daily         1. Mild intermittent asthma with acute exacerbation  Assessment & Plan:  Asthma seems to be doing well with his current exacerbation.  He is currently using steroids, however.  Continue to encourage him to use the albuterol as he is currently doing.  If he has increasing problems, would add back the Advair as well.  It is preferable to use the controller meds to needing to use the steroids as we have discussed previously.    This is his first exacerbation in quite a while.  Again, once he is better with this, return to as needed use of medications.         2. Non-recurrent acute serous otitis media of both ears  Comments:  Continued dysfunction of the right canal.  2 pills Ceftin left.  Trial Augmentin.  Possible eustachian tube dysfunction as well.  Flonase recommended.  Orders:  -     amoxicillin-clavulanate (AUGMENTIN) 875-125 mg per tablet; Take 1 tablet by mouth every 12 (twelve) hours  "for 10 days  -     fluticasone (FLONASE) 50 mcg/act nasal spray; 2 sprays into each nostril daily      Chief Complaint   Patient presents with    Ear Fullness     Bilateral ear blockage, pressure. No pain         History of Present Illness     Patient is here because he is continuing to have some symptoms.    Does continue to have sore throat, but the biggest problem is his ears are bothering him the most.  Cannot sleep well because of it.  Significant amount of congestion and irritation.    Using albuterol for his respiratory status, as well as had prednisone.  The breathing seems to be okay at the moment.        Sore Throat   This is a recurrent problem. The current episode started in the past 7 days. The problem has been waxing and waning. The pain is worse on the right side. There has been no fever. The fever has been present for Less than 1 day. The pain is at a severity of 2/10. The patient is experiencing no pain. Associated symptoms include congestion, coughing, ear pain (Patient has a significant mount of pressure in the ear) and a plugged ear sensation. Pertinent negatives include no shortness of breath.         Review of Systems   Constitutional: Negative.    HENT:  Positive for congestion, ear pain (Patient has a significant mount of pressure in the ear), postnasal drip, rhinorrhea (Comes and goes) and sore throat. Negative for sinus pressure and sinus pain.    Respiratory:  Positive for cough. Negative for shortness of breath.    Cardiovascular: Negative.    Gastrointestinal: Negative.            Objective     /90 (BP Location: Left arm, Patient Position: Sitting, Cuff Size: Standard)   Pulse 92   Ht 5' 3\" (1.6 m)   Wt 68.9 kg (152 lb)   SpO2 98%   BMI 26.93 kg/m²     Physical Exam  Vitals and nursing note reviewed.   Constitutional:       Appearance: Normal appearance. He is well-developed.   HENT:      Head: Normocephalic and atraumatic.      Right Ear: Hearing, ear canal and external ear " normal. Tympanic membrane is bulging. Tympanic membrane is not erythematous.      Left Ear: Hearing, tympanic membrane, ear canal and external ear normal.   Cardiovascular:      Rate and Rhythm: Normal rate and regular rhythm.      Pulses:           Carotid pulses are 2+ on the right side and 2+ on the left side.     Heart sounds: Normal heart sounds. No murmur heard.     No friction rub. No gallop.   Pulmonary:      Effort: Pulmonary effort is normal. No respiratory distress.      Breath sounds: Normal breath sounds. No wheezing or rales.   Musculoskeletal:      Cervical back: Normal range of motion and neck supple.   Neurological:      Mental Status: He is alert.

## 2024-11-18 ENCOUNTER — APPOINTMENT (OUTPATIENT)
Dept: LAB | Facility: MEDICAL CENTER | Age: 59
End: 2024-11-18
Payer: COMMERCIAL

## 2024-11-18 DIAGNOSIS — E05.00 GRAVES DISEASE: ICD-10-CM

## 2024-11-18 LAB
T4 FREE SERPL-MCNC: 0.98 NG/DL (ref 0.61–1.12)
TSH SERPL DL<=0.05 MIU/L-ACNC: 8.04 UIU/ML (ref 0.45–4.5)

## 2024-11-18 PROCEDURE — 84443 ASSAY THYROID STIM HORMONE: CPT

## 2024-11-18 PROCEDURE — 36415 COLL VENOUS BLD VENIPUNCTURE: CPT

## 2024-11-18 PROCEDURE — 84439 ASSAY OF FREE THYROXINE: CPT

## 2024-11-19 ENCOUNTER — RESULTS FOLLOW-UP (OUTPATIENT)
Dept: ENDOCRINOLOGY | Facility: CLINIC | Age: 59
End: 2024-11-19

## 2024-11-20 ENCOUNTER — OFFICE VISIT (OUTPATIENT)
Dept: ENDOCRINOLOGY | Facility: CLINIC | Age: 59
End: 2024-11-20
Payer: COMMERCIAL

## 2024-11-20 VITALS
OXYGEN SATURATION: 97 % | SYSTOLIC BLOOD PRESSURE: 138 MMHG | DIASTOLIC BLOOD PRESSURE: 94 MMHG | BODY MASS INDEX: 27.21 KG/M2 | HEART RATE: 82 BPM | HEIGHT: 63 IN | WEIGHT: 153.6 LBS

## 2024-11-20 DIAGNOSIS — R53.83 OTHER FATIGUE: ICD-10-CM

## 2024-11-20 DIAGNOSIS — E05.00 GRAVES DISEASE: Primary | ICD-10-CM

## 2024-11-20 PROBLEM — R25.1 TREMOR: Status: RESOLVED | Noted: 2023-10-04 | Resolved: 2024-11-20

## 2024-11-20 PROCEDURE — 99214 OFFICE O/P EST MOD 30 MIN: CPT | Performed by: INTERNAL MEDICINE

## 2024-11-20 RX ORDER — METHIMAZOLE 5 MG/1
5 TABLET ORAL 3 TIMES DAILY
Qty: 90 TABLET | Refills: 1 | Status: SHIPPED | OUTPATIENT
Start: 2024-11-20

## 2024-11-20 NOTE — ASSESSMENT & PLAN NOTE
History of Graves' disease since November 2023  Home regimen: Taking 5 mg of methimazole twice a day and metoprolol tartrate 25 mg twice a day.  Reporting fatigue, irritability, and heat and cold intolerance.  Denying changes in weight, bowel habits, palpitations, excessive sweating, weakness and tremors.  Labs reviewed-November 18, 2024 TSH elevated at 8.037 and normal free T4 0.98.  Elevated TSH consistent with overtreatment of Graves' disease.    Decrease methimazole to 5 mg daily and continue metoprolol titrate 25 mg twice a day  In 4-6 weeks, reassessing with free T4 and TSH  Follow-up in 3 months  Orders:    TSH, 3rd generation; Future    T4, free; Future    methimazole (TAPAZOLE) 5 mg tablet; Take 1 tablet (5 mg total) by mouth 3 (three) times a day

## 2024-11-20 NOTE — PROGRESS NOTES
Assessment & Plan  Graves disease  History of Graves' disease since November 2023  Home regimen: Taking 5 mg of methimazole twice a day and metoprolol tartrate 25 mg twice a day.  Reporting fatigue, irritability, and heat and cold intolerance.  Denying changes in weight, bowel habits, palpitations, excessive sweating, weakness and tremors.  Labs reviewed-November 18, 2024 TSH elevated at 8.037 and normal free T4 0.98.  Elevated TSH consistent with overtreatment of Graves' disease.    Decrease methimazole to 5 mg daily and continue metoprolol titrate 25 mg twice a day  In 4-6 weeks, reassessing with free T4 and TSH  Follow-up in 3 months  Orders:    TSH, 3rd generation; Future    T4, free; Future    methimazole (TAPAZOLE) 5 mg tablet; Take 1 tablet (5 mg total) by mouth 3 (three) times a day    Other fatigue  Most likely etiology of his current fatigue is overtreatment of Graves' disease, as noted by the elevated TSH.  In the past, when his TSH was around 2, reports that he felt back to baseline with more energy and less tiredness.  Other etiologies for fatigue could also include iron deficiency and vitamin D deficiency    Check iron panel and a vitamin D 25 level  Once resulted will contact patient to discuss  Orders:    Vitamin D 25 hydroxy; Future    TSH, 3rd generation; Future    T4, free; Future    TIBC Panel (incl. Iron, TIBC, % Iron Saturation); Future          HPI:   Noel Bird -  59 y.o. male with history of Graves' disease, that arrives to clinic for follow-up.     Patient is prescribed methimazole 10 mg twice a day, however, reports that he has been cutting the tabs in half since February 2024.  Patient also taking metoprolol tartrate 25 mg twice a day.    Patient reports feeling always tired even when he sleeps well, being irritable and jameson as per wife, heat and cold intolerance.  Denies changes in weight, changes in bowel habits, palpitations, bouts of profuse sweating, weakness and tremors.   Denies snoring, gasping and daytime sleepiness.       Patient has no other complaints at this time.      Subjective:  Review of Systems   Constitutional:  Positive for fatigue. Negative for chills, diaphoresis and unexpected weight change.   Eyes:  Negative for visual disturbance.   Respiratory:  Negative for chest tightness and shortness of breath.    Cardiovascular:  Negative for chest pain and palpitations.   Gastrointestinal:  Negative for abdominal pain, constipation, diarrhea, nausea and vomiting.   Endocrine: Positive for cold intolerance and heat intolerance. Negative for polydipsia, polyphagia and polyuria.   Skin:  Negative for color change and wound.   Neurological:  Negative for tremors, weakness, numbness and headaches.   Psychiatric/Behavioral:          Irritable        Patient Active Problem List   Diagnosis    Allergic rhinitis    Asthma    Laryngopharyngeal reflux (LPR)    Lesion of right nipple    Graves disease    HA (headache)        Current Outpatient Medications   Medication Sig Dispense Refill    albuterol (2.5 mg/3 mL) 0.083 % nebulizer solution Take 3 mL (2.5 mg total) by nebulization every 4 (four) hours as needed for wheezing or shortness of breath 540 mL 1    albuterol (Proventil HFA) 90 mcg/act inhaler Inhale 2 puffs every 6 (six) hours as needed for wheezing 6.7 g 5    fluticasone (FLONASE) 50 mcg/act nasal spray 2 sprays into each nostril daily 15.8 mL 2    Fluticasone-Salmeterol (Advair) 100-50 mcg/dose inhaler Inhale 1 puff 2 (two) times a day Rinse mouth after use. (Patient taking differently: Inhale 1 puff 2 (two) times a day Rinse mouth after use. PRN) 60 blister 5    lansoprazole (PREVACID) 30 mg capsule TAKE 1 CAPSULE (30 MG TOTAL) BY MOUTH DAILY FOR 90 DAYS 90 capsule 1    loratadine (CLARITIN) 10 mg tablet Take 10 mg by mouth 2 (two) times a day      methimazole (TAPAZOLE) 5 mg tablet Take 1 tablet (5 mg total) by mouth 3 (three) times a day 90 tablet 1    metoprolol tartrate  "(LOPRESSOR) 25 mg tablet Take 1 tablet (25 mg total) by mouth every 12 (twelve) hours 180 tablet 1    Multiple Vitamin (MULTIVITAMIN) capsule Take 1 capsule by mouth daily       No current facility-administered medications for this visit.        Allergies   Allergen Reactions    Bactrim [Sulfamethoxazole-Trimethoprim] Diarrhea        The following portions of the patient's history were reviewed and updated as appropriate: allergies, current medications, past family history, past medical history, past social history, past surgical history and problem list.             Objective:  /94   Pulse 82   Ht 5' 3\" (1.6 m)   Wt 69.7 kg (153 lb 9.6 oz)   SpO2 97%   BMI 27.21 kg/m²      Body mass index is 27.21 kg/m².     Physical Exam  Vitals reviewed.   Constitutional:       General: He is not in acute distress.     Appearance: Normal appearance. He is not ill-appearing.   HENT:      Head: Normocephalic and atraumatic.   Eyes:      General: No scleral icterus.     Conjunctiva/sclera: Conjunctivae normal.   Neck:      Thyroid: No thyroid mass, thyromegaly or thyroid tenderness.      Comments: Circumference 16.5 inches  Cardiovascular:      Rate and Rhythm: Normal rate and regular rhythm.   Pulmonary:      Effort: Pulmonary effort is normal. No respiratory distress.   Abdominal:      General: There is no distension.   Musculoskeletal:         General: Normal range of motion.      Cervical back: Normal range of motion.      Right lower leg: No edema.      Left lower leg: No edema.   Lymphadenopathy:      Cervical: No cervical adenopathy.   Skin:     General: Skin is dry.      Capillary Refill: Capillary refill takes less than 2 seconds.      Coloration: Skin is not jaundiced or pale.   Neurological:      General: No focal deficit present.      Mental Status: He is alert and oriented to person, place, and time. Mental status is at baseline.      Deep Tendon Reflexes:      Reflex Scores:       Bicep reflexes are 0 on the " right side and 0 on the left side.       Patellar reflexes are 2+ on the right side and 2+ on the left side.  Psychiatric:         Mood and Affect: Mood normal.         Behavior: Behavior normal.          Labs:  I have personally reviewed the following labs.   Latest Reference Range & Units 02/21/24 15:26 05/06/24 09:50 11/18/24 11:41   TSH 3RD GENERATON 0.450 - 4.500 uIU/mL 0.039 (L) 2.169 8.037 (H)   FREE T4 0.61 - 1.12 ng/dL 3.98 (H) 0.65 0.98   T3, Total 0.9-1.8 ng/mL ng/mL 5.9 (H)     (L): Data is abnormally low  (H): Data is abnormally high     Imaging:  I have personally reviewed the following imaging.  Result Text   THYROID SCAN AND UPTAKE  DATE: 10/30/2023      INDICATION:  E05.90: Thyrotoxicosis, unspecified without thyrotoxic crisis or storm. []     COMPARISON: None     TECHNIQUE:  The study was performed following the oral administration of 273 uCi I-123.     FINDINGS:     The thyroid scan demonstrates normal and symmetrical distribution of the radiopharmaceutical throughout both thyroid lobes without evidence of a discrete cold or hot nodule.     Thyroid uptake values are:     6 hours:   47.5% (N =  5 -15%)     24 hours: 60.7% (N =15 - 35%)     IMPRESSION:     1. Normal thyroid scan.  2. Elevated uptake values.  3. Findings consistent with Graves' disease in the appropriate clinical setting.          Alex Varghese MD  Endocrinology Fellow, PGY-4

## 2025-01-07 ENCOUNTER — TELEMEDICINE (OUTPATIENT)
Dept: FAMILY MEDICINE CLINIC | Facility: CLINIC | Age: 60
End: 2025-01-07
Payer: COMMERCIAL

## 2025-01-07 VITALS — HEIGHT: 63 IN | WEIGHT: 152 LBS | BODY MASS INDEX: 26.93 KG/M2

## 2025-01-07 DIAGNOSIS — J45.20 MILD INTERMITTENT ASTHMA WITHOUT COMPLICATION: ICD-10-CM

## 2025-01-07 DIAGNOSIS — J01.40 ACUTE NON-RECURRENT PANSINUSITIS: Primary | ICD-10-CM

## 2025-01-07 DIAGNOSIS — E05.00 GRAVES DISEASE: ICD-10-CM

## 2025-01-07 PROCEDURE — 99214 OFFICE O/P EST MOD 30 MIN: CPT | Performed by: FAMILY MEDICINE

## 2025-01-07 RX ORDER — DOXYCYCLINE 100 MG/1
100 CAPSULE ORAL EVERY 12 HOURS SCHEDULED
Qty: 20 CAPSULE | Refills: 0 | Status: SHIPPED | OUTPATIENT
Start: 2025-01-07 | End: 2025-01-09 | Stop reason: SINTOL

## 2025-01-07 NOTE — PROGRESS NOTES
Virtual Regular Visit  Name: Noel Bird      : 1965      MRN: [id number]  Encounter Provider: Alan Haynes MD  Encounter Date: 2025   Encounter department: [facility]      Verification of patient location:  Patient is located at Home in the following state in which I hold an active license [place] :  Assessment & Plan  Acute non-recurrent pansinusitis    Orders:  •  doxycycline hyclate (VIBRAMYCIN) 100 mg capsule; Take 1 capsule (100 mg total) by mouth every 12 (twelve) hours for 10 days    Mild intermittent asthma without complication  Stable. No changes.  Doing well for now.  Will agressively treat sinus to try to prevent asthma exacerbation.         Graves disease  Following with Endocrine. Doing well.             Encounter provider Alan Haynes MD    The patient was identified by name and date of birth. Noel Bird was informed that this is a telemedicine visit and that the visit is being conducted through the Epic Embedded platform. He agrees to proceed..  My office door was closed. No one else was in the room.  He acknowledged consent and understanding of privacy and security of the video platform. The patient has agreed to participate and understands they can discontinue the visit at any time.    Patient is aware this is a billable service.       1. Acute non-recurrent pansinusitis  Comments:  Recommend doxycycline.  100 mg twice a day.  Follow-up in 2 weeks if needed.  Reviewed about potential side effects.  Orders:  -     doxycycline hyclate (VIBRAMYCIN) 100 mg capsule; Take 1 capsule (100 mg total) by mouth every 12 (twelve) hours for 10 days  2. Mild intermittent asthma without complication  Assessment & Plan:  Stable. No changes.  Doing well for now.  Will agressively treat sinus to try to prevent asthma exacerbation.         3. Graves disease  Assessment & Plan:  Following with Endocrine. Doing well.             Chief Complaint   Patient presents with  "  • Virtual Brief Visit     Virtual video for sxs of congestion, stomach pain, sinus pressure, fever, headache, ear clogged onset Monday, Negative at home covid test.   • Virtual Regular Visit         History of Present Illness     HA, congestion, stomach cramps. Body aches, feels exhausted.  No NVD.  No change in smell/taste.   Neg COVID test.  Sx started Monday, COVID test this AM.    Reviewed about sinus infection.  Nothing major with this.  Some post nasal drip.  No wheezes.  Increased mucous in throat at times.          Review of Systems   Constitutional:  Positive for fatigue and fever. Negative for appetite change, chills and diaphoresis.   HENT:  Positive for congestion, postnasal drip, sinus pressure and sinus pain. Negative for ear pain (Ear clogged), facial swelling, rhinorrhea, sore throat, trouble swallowing and voice change.    Respiratory:  Negative for cough, chest tightness, shortness of breath and wheezing.    Cardiovascular:  Negative for chest pain.   Gastrointestinal:  Positive for abdominal pain. Negative for diarrhea, nausea and vomiting.   Musculoskeletal:  Negative for myalgias.   Neurological:  Positive for headaches.       Objective   Ht 5' 3\" (1.6 m)   Wt 68.9 kg (152 lb)   BMI 26.93 kg/m²     Physical Exam  Nursing note reviewed.   Constitutional:       Appearance: He is well-developed.   HENT:      Head: Normocephalic and atraumatic.   Pulmonary:      Effort: Pulmonary effort is normal.      Breath sounds: Normal breath sounds.         Visit Time  Total Visit Duration: 20    "

## 2025-01-07 NOTE — PATIENT INSTRUCTIONS
1. Acute non-recurrent pansinusitis  Comments:  Recommend doxycycline.  100 mg twice a day.  Follow-up in 2 weeks if needed.  Reviewed about potential side effects.  Orders:  -     doxycycline hyclate (VIBRAMYCIN) 100 mg capsule; Take 1 capsule (100 mg total) by mouth every 12 (twelve) hours for 10 days  2. Mild intermittent asthma without complication  Assessment & Plan:  Stable. No changes.  Doing well for now.  Will agressively treat sinus to try to prevent asthma exacerbation.         3. Graves disease  Assessment & Plan:  Following with Endocrine. Doing well.             COVID 19 Instructions    Noel Bird was advised to limit contact with others to essential tasks such as getting food, medications, and medical care.    Proper handwashing reviewed, and Hand sanitzer when washing is not available.    If the patient develops symptoms of COVID 19, the patient should call the office as soon as possible.    It is strongly recommended that Flu Vaccinations be obtained.      Virtual Visits:  Roberto: This works on smart phones (any phone with Internet browsing capability).  You should get a text message when the provider is ready to see you.  Click on the link in the text message, and the call should start.  You will need to type in your name, and allow camera and microphone access.  This is HIPPA compliant, and secure.      If you have not already done so, get immunized to COVID 19.      We are committed to getting you vaccinated as soon as possible and will be closely following CDC and [facility] guidelines as they are released and revised.  Please refer to our COVID-19 vaccine webpage for the most up to date information on the vaccine and our distribution efforts.    This site will also have the most up to date recommendations for COVID booster vaccine.    [email or web address]://[email or web address]-vaccine    Call [phone]-[phone] ([phone]), option 7    You can also visit  https://www.vaccines.gov/ to find vaccines in your area.    OUR LOCATION:    [address], [address]  [place], [address] [phone]  Fax: [phone]    Lab services, Rheumatology, and OB/GYN are at this location as well.

## 2025-01-07 NOTE — ASSESSMENT & PLAN NOTE
Stable. No changes.  Doing well for now.  Will agressively treat sinus to try to prevent asthma exacerbation.

## 2025-01-09 ENCOUNTER — APPOINTMENT (OUTPATIENT)
Dept: LAB | Facility: MEDICAL CENTER | Age: 60
End: 2025-01-09
Payer: COMMERCIAL

## 2025-01-09 ENCOUNTER — TELEPHONE (OUTPATIENT)
Age: 60
End: 2025-01-09

## 2025-01-09 ENCOUNTER — TELEPHONE (OUTPATIENT)
Dept: FAMILY MEDICINE CLINIC | Facility: CLINIC | Age: 60
End: 2025-01-09

## 2025-01-09 DIAGNOSIS — R17 JAUNDICE: ICD-10-CM

## 2025-01-09 DIAGNOSIS — R53.83 OTHER FATIGUE: ICD-10-CM

## 2025-01-09 DIAGNOSIS — E05.00 GRAVES DISEASE: ICD-10-CM

## 2025-01-09 DIAGNOSIS — R17 JAUNDICE: Primary | ICD-10-CM

## 2025-01-09 LAB
25(OH)D3 SERPL-MCNC: 46 NG/ML (ref 30–100)
ALBUMIN SERPL BCG-MCNC: 4.4 G/DL (ref 3.5–5)
ALP SERPL-CCNC: 193 U/L (ref 34–104)
ALT SERPL W P-5'-P-CCNC: 145 U/L (ref 7–52)
AST SERPL W P-5'-P-CCNC: 56 U/L (ref 13–39)
BILIRUB DIRECT SERPL-MCNC: 4.04 MG/DL (ref 0–0.2)
BILIRUB SERPL-MCNC: 6.94 MG/DL (ref 0.2–1)
IRON SATN MFR SERPL: 21 % (ref 15–50)
IRON SERPL-MCNC: 70 UG/DL (ref 50–212)
PROT SERPL-MCNC: 7.7 G/DL (ref 6.4–8.4)
T4 FREE SERPL-MCNC: 1.34 NG/DL (ref 0.61–1.12)
TIBC SERPL-MCNC: 331.8 UG/DL (ref 250–450)
TRANSFERRIN SERPL-MCNC: 237 MG/DL (ref 203–362)
TSH SERPL DL<=0.05 MIU/L-ACNC: 0.35 UIU/ML (ref 0.45–4.5)
UIBC SERPL-MCNC: 262 UG/DL (ref 155–355)

## 2025-01-09 PROCEDURE — 84439 ASSAY OF FREE THYROXINE: CPT

## 2025-01-09 PROCEDURE — 80076 HEPATIC FUNCTION PANEL: CPT

## 2025-01-09 PROCEDURE — 36415 COLL VENOUS BLD VENIPUNCTURE: CPT

## 2025-01-09 PROCEDURE — 84443 ASSAY THYROID STIM HORMONE: CPT

## 2025-01-09 PROCEDURE — 83540 ASSAY OF IRON: CPT

## 2025-01-09 PROCEDURE — 82306 VITAMIN D 25 HYDROXY: CPT

## 2025-01-09 PROCEDURE — 83550 IRON BINDING TEST: CPT

## 2025-01-09 NOTE — TELEPHONE ENCOUNTER
Alan Haynes MD to [facility]       1/9/25 11:43 AM   Please stop the doxycycline, check lab for liver function  Alan Haynes MD to Noel Bird         1/9/25 11:43 AM  I saw your message.  Please do stop the doxycycline immediately.  Also, you will need to have hepatic function panel done.  This looks for the cause for the liver changes.  As far as antibiotics and different ones, will consider amoxicillin.    This [facility] MyChart message has not been read.   Mom advised.  Medication sent.

## 2025-01-09 NOTE — TELEPHONE ENCOUNTER
Patients spouse Yenifer called in regards to patient getting possible side affect from the doxycycline. Yenifer stated that patient took medication for the first time Tuesday night and again on Wednesday. Kam stated that after patient took medication she noticed that patients whites of eyes were yellowish, patient was having stomach pain, and dark urine. Yenifer stated this can be side effects of the medication but also patients graves disease and is concerned.  Patient spouse would like to know if medication can be changed to an alternative and if she would have to reach out to endocrinology.

## 2025-01-10 ENCOUNTER — RESULTS FOLLOW-UP (OUTPATIENT)
Dept: FAMILY MEDICINE CLINIC | Facility: CLINIC | Age: 60
End: 2025-01-10

## 2025-01-10 DIAGNOSIS — R17 JAUNDICE: Primary | ICD-10-CM

## 2025-01-13 ENCOUNTER — TELEPHONE (OUTPATIENT)
Age: 60
End: 2025-01-13

## 2025-01-13 NOTE — TELEPHONE ENCOUNTER
Patients wife calling for patients lab results from 1/9. She states he was recently on doxycycline and his eyes and skin started to turn yellow. He stopped the doxy and will re test hepatic function. But she wanted to let Dr. Thomas know because PCP told them it could also be his graves disease.

## 2025-01-14 ENCOUNTER — RESULTS FOLLOW-UP (OUTPATIENT)
Dept: ENDOCRINOLOGY | Facility: CLINIC | Age: 60
End: 2025-01-14

## 2025-01-15 NOTE — TELEPHONE ENCOUNTER
Spoke with Noel via phone-suspect this recent liver dysfunction is from doxycycline.  Discussed with patient that although methimazole could cause a similar picture of jaundice and scleral icterus-itis, it is unlikely to be the cause of his current liver dysfunction as he had been on this medication before and was on a new medication such as doxycycline.  He has stopped doxycycline at this time and reports that the skin and eye changes have improved.    Based on his most recent thyroid function testing, patient is biochemically hyperthyroid.  He is currently on methimazole 5 mg daily, and plan to continue this dose for now pending repeat liver function testing that he will complete this weekend.  Based on those lab results, if improved, will discuss with patient about increasing the methimazole to 10 mg daily and reassessing afterwards his TFTs in 4-6 weeks.      Will follow hepatic function panel ordered by his PCP (CC'd results)

## 2025-01-17 ENCOUNTER — APPOINTMENT (OUTPATIENT)
Dept: LAB | Facility: MEDICAL CENTER | Age: 60
End: 2025-01-17
Payer: COMMERCIAL

## 2025-01-17 DIAGNOSIS — R17 JAUNDICE: ICD-10-CM

## 2025-01-17 LAB
ALBUMIN SERPL BCG-MCNC: 4.2 G/DL (ref 3.5–5)
ALP SERPL-CCNC: 147 U/L (ref 34–104)
ALT SERPL W P-5'-P-CCNC: 121 U/L (ref 7–52)
AST SERPL W P-5'-P-CCNC: 63 U/L (ref 13–39)
BILIRUB DIRECT SERPL-MCNC: 0.6 MG/DL (ref 0–0.2)
BILIRUB SERPL-MCNC: 1.54 MG/DL (ref 0.2–1)
PROT SERPL-MCNC: 7.1 G/DL (ref 6.4–8.4)

## 2025-01-17 PROCEDURE — 36415 COLL VENOUS BLD VENIPUNCTURE: CPT

## 2025-01-17 PROCEDURE — 80076 HEPATIC FUNCTION PANEL: CPT

## 2025-01-20 ENCOUNTER — RESULTS FOLLOW-UP (OUTPATIENT)
Dept: FAMILY MEDICINE CLINIC | Facility: CLINIC | Age: 60
End: 2025-01-20

## 2025-01-20 DIAGNOSIS — R17 JAUNDICE: Primary | ICD-10-CM

## 2025-01-23 NOTE — TELEPHONE ENCOUNTER
Spoke with Noel-we discussed on holding methimazole for now until he repeats his liver function test.  He does have upcoming test tomorrow for level panel.  I have CCed myself on his results and will be in contact with patient for further recommendations after results.  Noel confirmed understanding.

## 2025-01-24 ENCOUNTER — APPOINTMENT (OUTPATIENT)
Dept: LAB | Facility: MEDICAL CENTER | Age: 60
End: 2025-01-24
Payer: COMMERCIAL

## 2025-01-24 DIAGNOSIS — R17 JAUNDICE: ICD-10-CM

## 2025-01-24 LAB
ALBUMIN SERPL BCG-MCNC: 4.1 G/DL (ref 3.5–5)
ALP SERPL-CCNC: 111 U/L (ref 34–104)
ALT SERPL W P-5'-P-CCNC: 54 U/L (ref 7–52)
AST SERPL W P-5'-P-CCNC: 27 U/L (ref 13–39)
BILIRUB DIRECT SERPL-MCNC: 0.41 MG/DL (ref 0–0.2)
BILIRUB SERPL-MCNC: 1.13 MG/DL (ref 0.2–1)
PROT SERPL-MCNC: 7 G/DL (ref 6.4–8.4)

## 2025-01-24 PROCEDURE — 36415 COLL VENOUS BLD VENIPUNCTURE: CPT

## 2025-01-24 PROCEDURE — 80076 HEPATIC FUNCTION PANEL: CPT

## 2025-01-28 ENCOUNTER — RESULTS FOLLOW-UP (OUTPATIENT)
Dept: FAMILY MEDICINE CLINIC | Facility: CLINIC | Age: 60
End: 2025-01-28

## 2025-01-28 DIAGNOSIS — E05.00 GRAVES DISEASE: ICD-10-CM

## 2025-01-29 ENCOUNTER — TELEPHONE (OUTPATIENT)
Dept: ENDOCRINOLOGY | Facility: CLINIC | Age: 60
End: 2025-01-29

## 2025-01-29 DIAGNOSIS — S36.119D HEPATIC TRAUMA, SUBSEQUENT ENCOUNTER: ICD-10-CM

## 2025-01-29 DIAGNOSIS — E05.00 GRAVES DISEASE: Primary | ICD-10-CM

## 2025-02-24 ENCOUNTER — APPOINTMENT (OUTPATIENT)
Dept: LAB | Facility: MEDICAL CENTER | Age: 60
End: 2025-02-24
Payer: COMMERCIAL

## 2025-02-24 DIAGNOSIS — E05.00 GRAVES DISEASE: ICD-10-CM

## 2025-02-24 DIAGNOSIS — S36.119D HEPATIC TRAUMA, SUBSEQUENT ENCOUNTER: ICD-10-CM

## 2025-02-24 LAB
ALBUMIN SERPL BCG-MCNC: 4.3 G/DL (ref 3.5–5)
ALP SERPL-CCNC: 86 U/L (ref 34–104)
ALT SERPL W P-5'-P-CCNC: 17 U/L (ref 7–52)
AST SERPL W P-5'-P-CCNC: 19 U/L (ref 13–39)
BILIRUB DIRECT SERPL-MCNC: 0.29 MG/DL (ref 0–0.2)
BILIRUB SERPL-MCNC: 0.94 MG/DL (ref 0.2–1)
PROT SERPL-MCNC: 7 G/DL (ref 6.4–8.4)
T4 FREE SERPL-MCNC: 1.39 NG/DL (ref 0.61–1.12)
TSH SERPL DL<=0.05 MIU/L-ACNC: <0.01 UIU/ML (ref 0.45–4.5)

## 2025-02-24 PROCEDURE — 80076 HEPATIC FUNCTION PANEL: CPT

## 2025-02-24 PROCEDURE — 84439 ASSAY OF FREE THYROXINE: CPT

## 2025-02-24 PROCEDURE — 84443 ASSAY THYROID STIM HORMONE: CPT

## 2025-02-24 PROCEDURE — 36415 COLL VENOUS BLD VENIPUNCTURE: CPT

## 2025-02-25 ENCOUNTER — RESULTS FOLLOW-UP (OUTPATIENT)
Dept: ENDOCRINOLOGY | Facility: CLINIC | Age: 60
End: 2025-02-25

## 2025-02-26 ENCOUNTER — OFFICE VISIT (OUTPATIENT)
Dept: ENDOCRINOLOGY | Facility: CLINIC | Age: 60
End: 2025-02-26
Payer: COMMERCIAL

## 2025-02-26 VITALS
OXYGEN SATURATION: 98 % | DIASTOLIC BLOOD PRESSURE: 80 MMHG | WEIGHT: 155 LBS | BODY MASS INDEX: 27.46 KG/M2 | HEIGHT: 63 IN | HEART RATE: 69 BPM | SYSTOLIC BLOOD PRESSURE: 120 MMHG

## 2025-02-26 DIAGNOSIS — E05.90 HYPERTHYROIDISM: Primary | ICD-10-CM

## 2025-02-26 DIAGNOSIS — E05.00 GRAVES DISEASE: ICD-10-CM

## 2025-02-26 PROCEDURE — 99214 OFFICE O/P EST MOD 30 MIN: CPT | Performed by: PHYSICIAN ASSISTANT

## 2025-02-26 RX ORDER — METHIMAZOLE 5 MG/1
5 TABLET ORAL 2 TIMES DAILY
Qty: 60 TABLET | Refills: 1 | Status: SHIPPED | OUTPATIENT
Start: 2025-02-26

## 2025-02-26 NOTE — ASSESSMENT & PLAN NOTE
December 9, 2023       Xiomara Anthony MD  [address]  [address]  [facility] [address]  Via Fax: [phone]      Patient: Zoran Deleon   YOB: 2018   Date of Visit: 12/9/2023       Dear Dr. Anthony:    Thank you for referring Zoran Deleon to me for evaluation. Below are my notes for this visit with him.    If you have questions, please do not hesitate to call me. I look forward to following your patient along with you.      Sincerely,        Maria Dolores Hancock MD        CC: No Recipients    Maria Dolores Hancock MD  12/9/2023  5:28 PM  Signed  Subjective  Patient ID: Zoran is a 5 year old male.    Chief Complaint   Patient presents with   • Ear Pain     Right ear pain starting Thursday       HPI: Right ear pain 2 days ago.Improved with Tylenol. Has not had pain since. Rhinorrhea, nasal congestion and cough. Fever, tm 101 since this afternoon. No COVID exposure, sister with same sx's.. No recent travel.     REVIEW OF SYSTEMS: Denies difficulty breathing, anosmia, ageusia, hypogeusia, vomiting, and diarrhea. Denies sleep disturbances, headaches, sore throat, abdominal pain, dysuria, hematuria, enuresis, urinary frequency or urgency. Normal appetite. Normal fluid intake. Normal urine output. Normal energy.     All other systems reviewed and are negative.    PAST MEDICAL HISTORY:  History reviewed. No pertinent past medical history.    MEDICATIONS:  Current Outpatient Medications   Medication Sig   • cefdinir (OMNICEF) 250 MG/5ML suspension Take 3.3 mLs by mouth in the morning and 3.3 mLs in the evening. Do all this for 10 days.   • albuterol 108 (90 Base) MCG/ACT inhaler Inhale 2 puffs into the lungs every 4 hours as needed for Shortness of Breath.   • fluticasone propionate (FLOVENT HFA) 44 MCG/ACT inhaler Inhale 2 puffs into the lungs in the morning and 2 puffs in the evening.   • Spacer/Aero-Holding Chambers (OptiChamber Advantage-Med Mask) Misc Use with inhaler     No current    Orders:    methimazole (TAPAZOLE) 5 mg tablet; Take 1 tablet (5 mg total) by mouth 2 (two) times a day     facility-administered medications for this visit.       ALLERGIES:  ALLERGIES:   Allergen Reactions   • Amoxicillin RASH       PAST SURGICAL HISTORY:  History reviewed. No pertinent surgical history.    FAMILY HISTORY:  History reviewed. No pertinent family history.    Social History     Socioeconomic History   • Marital status: Single           Visit Vitals  Pulse (!) 128   Temp 97.6 °F (36.4 °C)   Wt 23.9 kg (52 lb 11 oz)   SpO2 99%       Vitals signs and nursing note reviewed.     Physical Exam     PHYSICAL EXAM:    Constitutional:       General: Active and alert     Appearance: Normal appearance. Well-developed and well-hydrated.  HENT:      Head: Normocephalic and atraumatic.      Right Ear: Tympanic membrane injected, dull and full of mucoid fluid, ear canal and external ear normal.      Left Ear: Tympanic membrane injected, dull and full of mucoid fluid, ear canal and external ear normal.      Nose: Clear without discharge      Mouth: Mucous membranes are moist.      Pharynx: Oropharynx with erythema but no exudate or lesions. Uvula is midline. Airway is intact  Eyes:      Extraocular Movements: Extraocular movements intact.      Conjunctiva/sclera: Conjunctivae normal.   Neck:      Musculoskeletal: Normal range of motion and neck supple.   Cardiovascular:      Rate and Rhythm: Normal rate and regular rhythm.      Pulses: Normal pulses.      Heart sounds: Normal heart sounds.   Pulmonary:      Effort: Pulmonary effort is normal.      Breath sounds: Normal breath sounds. No wheezing, rhonchi or rales.  Abdominal:      General: Abdomen is flat. Bowel sounds are normal. Nondistended.     Palpations: Abdomen is soft and nontender. No hepatosplenomegaly or masses.  Lymphadenopathy:      Cervical: No cervical adenopathy.      Upper Body:      Right upper body: No supraclavicular adenopathy.      Left upper body: No supraclavicular adenopathy.   Skin:     General: Skin is warm and dry with normal skin turgor. Clear  without rashes.  Neurological:      Mental Status: Alert.     ASSESSMENT/PLAN:  (H66.93) Acute otitis media of both ears in pediatric patient  (primary encounter diagnosis)  Plan: POCT Streptococcus Group A PCR    (J02.9) Pharyngitis, unspecified etiology  Plan: POCT Streptococcus Group A PCR    (J02.0) Strep pharyngitis     Orders Placed This Encounter   • POCT Streptococcus Group A PCR   • cefdinir (OMNICEF) 250 MG/5ML suspension      Recent Results (from the past 24 hour(s))   POCT Streptococcus Group A PCR    Collection Time: 12/09/23  4:33 PM   Result Value Ref Range    STREPTOCOCCUS GROUP A PCR Detected (A) Not Detected    TEST LOT NUMBER NA     TEST LOT EXPIRATION DATE NA         PATIENT INSTRUCTIONS:  Complete antibiotics as instructed  No school until patient receives at least 2 doses of antibiotics and at least 24 hours after the start of antibiotics and patient's last fever  Recommend starting a daily probiotic (e.g. Culturelle, Florastor,Biogaia, Upspring, Garden of Life, or generic equivalent ) while taking antibiotics  Take the probiotic daily at least 2 hours apart from the antibiotic dose while on antibiotics and then an additional 3-4 weeks upon completion of the antibiotics   Drink plenty of fluids - may give chamomile tea with honey  Acetaminophen/Ibuprofen as needed  Salt water rinses  Throat lozenges  Change toothbrush after 2 days of antibiotics  Keep patient's toothbrush away from everyone's in a separate davalos  Call us or your child's doctor if fever is greater than 105, lasting longer than 5 days, difficulty breathing, neck pain or stiffness, decreased urine output or if patient is not better after completion of antibiotics     FOLLOW-UP:  Return if symptoms worsen or fail to improve, for Follow up with PCP in 3-5 days.     Plan of care and anticipatory guidance discussed with patient/parents.  Parents displayed good understanding of plan of care.    Maria Dolores Hancock M.D.  [facility]  [facility]

## 2025-02-26 NOTE — PATIENT INSTRUCTIONS
Monitor for side effects of nausea, vomiting, rash, joint pain, itching, fever - please contact the office if these develop  Obtain labs to monitor your thyroid and liver prior to your next appointment

## 2025-02-26 NOTE — PROGRESS NOTES
Established Patient Progress Note      Chief Complaint   Patient presents with    Hyperthyroidism        Impression & Plan:    Assessment & Plan  Hyperthyroidism  Dose reduced from 5 mg methimazole BID to 5 mg daily 11/20/2024  Labs from 1/9 indicated LFT elevation while on doxycycline  Methimazole held for 1 week  Patient has been on methimazole 5 mg daily for over 1 month  LFTs have normalized  TSH: <0.010  T4: 1.39  Increase methimazole to 5mg BID and repeat labs in 4 weeks  Orders:    TSH + Free T4; Future    Thyroid stimulating immunoglobulin; Future    Ferritin; Future    Graves disease    Orders:    methimazole (TAPAZOLE) 5 mg tablet; Take 1 tablet (5 mg total) by mouth 2 (two) times a day      History of Present Illness:   Noel Bird is a 59 y.o. male with past medical history of Graves' disease, hyperthyroidism presents for follow-up.    Patient's symptom of being tired has persisted, but maybe mildly improved. Partner with patient reports his sleep scheduled has worsened for the past month. Patient had held methimazole for about a week while he was sick and on antibiotics in the beginning of January. During that time he had mild scleral icterus and they express concern over liver function.    Since holding methimazole, his sleep schedule has never returned to baseline, despite resuming 5 mg daily methimazole. Patient lacks energy to do things.     Patient has been compliant with methimazole. Denies nausea, vomiting, rash, joint pain, itching or fever. Denies weight fluctuations, mood instability, changes in hair growth patterns, difficulty swallowing, breathing or changes in voice.       Patient Active Problem List   Diagnosis    Allergic rhinitis    Asthma    Laryngopharyngeal reflux (LPR)    Lesion of right nipple    Graves disease    HA (headache)      Past Medical History:   Diagnosis Date    Asthma       History reviewed. No pertinent surgical history.   Family History   Problem Relation Age of  Onset    Diabetes Mother     Hypertension Mother     Thyroid disease Mother         thyroid disorder    Prostate cancer Father      Social History     Tobacco Use    Smoking status: Never    Smokeless tobacco: Never   Substance Use Topics    Alcohol use: Never     Allergies   Allergen Reactions    Bactrim [Sulfamethoxazole-Trimethoprim] Diarrhea    Doxycycline Other (See Comments)     Jaundice         Current Outpatient Medications:     albuterol (2.5 mg/3 mL) 0.083 % nebulizer solution, Take 3 mL (2.5 mg total) by nebulization every 4 (four) hours as needed for wheezing or shortness of breath, Disp: 540 mL, Rfl: 1    albuterol (Proventil HFA) 90 mcg/act inhaler, Inhale 2 puffs every 6 (six) hours as needed for wheezing, Disp: 6.7 g, Rfl: 5    lansoprazole (PREVACID) 30 mg capsule, TAKE 1 CAPSULE (30 MG TOTAL) BY MOUTH DAILY FOR 90 DAYS, Disp: 90 capsule, Rfl: 1    loratadine (CLARITIN) 10 mg tablet, Take 10 mg by mouth 2 (two) times a day, Disp: , Rfl:     methimazole (TAPAZOLE) 5 mg tablet, Take 1 tablet (5 mg total) by mouth 2 (two) times a day, Disp: 60 tablet, Rfl: 1    metoprolol tartrate (LOPRESSOR) 25 mg tablet, Take 1 tablet (25 mg total) by mouth every 12 (twelve) hours, Disp: 180 tablet, Rfl: 1    Multiple Vitamin (MULTIVITAMIN) capsule, Take 1 capsule by mouth daily, Disp: , Rfl:     fluticasone (FLONASE) 50 mcg/act nasal spray, 2 sprays into each nostril daily, Disp: 15.8 mL, Rfl: 2    Fluticasone-Salmeterol (Advair) 100-50 mcg/dose inhaler, Inhale 1 puff 2 (two) times a day Rinse mouth after use. (Patient not taking: Reported on 2/26/2025), Disp: 60 blister, Rfl: 5    Review of Systems   Constitutional:  Positive for fatigue.   HENT:  Negative for trouble swallowing and voice change.    Respiratory:  Negative for shortness of breath.    Cardiovascular:  Negative for chest pain and palpitations.   Gastrointestinal:  Negative for abdominal pain, diarrhea, nausea and vomiting.   All other systems reviewed  "and are negative.      Physical Exam:  Body mass index is 27.46 kg/m².  /80   Pulse 69   Ht 5' 3\" (1.6 m)   Wt 70.3 kg (155 lb)   SpO2 98%   BMI 27.46 kg/m²    Wt Readings from Last 3 Encounters:   02/26/25 70.3 kg (155 lb)   01/07/25 68.9 kg (152 lb)   11/20/24 69.7 kg (153 lb 9.6 oz)       Physical Exam  Vitals and nursing note reviewed.   Constitutional:       General: He is not in acute distress.     Appearance: He is well-developed.   HENT:      Head: Normocephalic and atraumatic.   Eyes:      General: No scleral icterus.     Conjunctiva/sclera: Conjunctivae normal.   Neck:      Thyroid: No thyroid mass, thyromegaly or thyroid tenderness.   Cardiovascular:      Rate and Rhythm: Normal rate and regular rhythm.      Heart sounds: No murmur heard.  Pulmonary:      Effort: Pulmonary effort is normal. No respiratory distress.      Breath sounds: Normal breath sounds. No wheezing or rales.   Abdominal:      General: There is no distension.      Palpations: Abdomen is soft.      Tenderness: There is no abdominal tenderness.   Skin:     General: Skin is warm and dry.      Findings: No erythema.   Neurological:      Mental Status: He is alert.         Labs:   No results found for: \"HGBA1C\"  Lab Results   Component Value Date    CREATININE 0.82 10/06/2023    CREATININE 1.23 12/28/2018    BUN 19 10/06/2023    K 4.2 10/06/2023     10/06/2023    CO2 27 10/06/2023     eGFR   Date Value Ref Range Status   10/06/2023 96 ml/min/1.73sq m Final     Lab Results   Component Value Date    HDL 40 10/06/2023    TRIG 89 10/06/2023     Lab Results   Component Value Date    ALT 17 02/24/2025    AST 19 02/24/2025    ALKPHOS 86 02/24/2025     Lab Results   Component Value Date    VEQ7LFEQYVKO <0.010 (L) 02/24/2025    QBL6HFTEFBBK 0.354 (L) 01/09/2025    UBR2AENADVBZ 8.037 (H) 11/18/2024     Lab Results   Component Value Date    FREET4 1.39 (H) 02/24/2025       Orders Placed This Encounter   Procedures    TSH + Free T4     " Standing Status:   Future     Expected Date:   3/26/2025     Expiration Date:   2/26/2026    Thyroid stimulating immunoglobulin     Standing Status:   Future     Expiration Date:   4/26/2026    Ferritin     Standing Status:   Future     Expiration Date:   4/26/2026       Patient Instructions   Monitor for side effects of nausea, vomiting, rash, joint pain, itching, fever - please contact the office if these develop  Obtain labs to monitor your thyroid and liver prior to your next appointment      Discussed with the patient and all questioned fully answered. He will call me if any problems arise.    Cinthya Montero PA-C

## 2025-04-01 ENCOUNTER — APPOINTMENT (OUTPATIENT)
Dept: LAB | Facility: CLINIC | Age: 60
End: 2025-04-01
Payer: COMMERCIAL

## 2025-04-01 ENCOUNTER — OFFICE VISIT (OUTPATIENT)
Dept: FAMILY MEDICINE CLINIC | Facility: CLINIC | Age: 60
End: 2025-04-01
Payer: COMMERCIAL

## 2025-04-01 VITALS
RESPIRATION RATE: 16 BRPM | HEART RATE: 79 BPM | HEIGHT: 63 IN | DIASTOLIC BLOOD PRESSURE: 88 MMHG | OXYGEN SATURATION: 98 % | SYSTOLIC BLOOD PRESSURE: 124 MMHG | TEMPERATURE: 98.2 F | BODY MASS INDEX: 27.43 KG/M2 | WEIGHT: 154.8 LBS

## 2025-04-01 DIAGNOSIS — N64.9 LESION OF RIGHT NIPPLE: ICD-10-CM

## 2025-04-01 DIAGNOSIS — J30.89 NON-SEASONAL ALLERGIC RHINITIS, UNSPECIFIED TRIGGER: ICD-10-CM

## 2025-04-01 DIAGNOSIS — J45.20 MILD INTERMITTENT ASTHMA WITHOUT COMPLICATION: ICD-10-CM

## 2025-04-01 DIAGNOSIS — E05.90 HYPERTHYROIDISM: ICD-10-CM

## 2025-04-01 DIAGNOSIS — J01.40 ACUTE NON-RECURRENT PANSINUSITIS: Primary | ICD-10-CM

## 2025-04-01 DIAGNOSIS — E05.00 GRAVES DISEASE: ICD-10-CM

## 2025-04-01 LAB
FERRITIN SERPL-MCNC: 57 NG/ML (ref 24–336)
T4 FREE SERPL-MCNC: 0.97 NG/DL (ref 0.61–1.12)
TSH SERPL DL<=0.05 MIU/L-ACNC: 0.03 UIU/ML (ref 0.45–4.5)

## 2025-04-01 PROCEDURE — 82728 ASSAY OF FERRITIN: CPT

## 2025-04-01 PROCEDURE — 36415 COLL VENOUS BLD VENIPUNCTURE: CPT

## 2025-04-01 PROCEDURE — 84445 ASSAY OF TSI GLOBULIN: CPT

## 2025-04-01 PROCEDURE — 99214 OFFICE O/P EST MOD 30 MIN: CPT | Performed by: FAMILY MEDICINE

## 2025-04-01 PROCEDURE — 84439 ASSAY OF FREE THYROXINE: CPT

## 2025-04-01 PROCEDURE — 84443 ASSAY THYROID STIM HORMONE: CPT

## 2025-04-01 RX ORDER — CEFUROXIME AXETIL 500 MG/1
500 TABLET ORAL EVERY 12 HOURS SCHEDULED
Qty: 20 TABLET | Refills: 0 | Status: SHIPPED | OUTPATIENT
Start: 2025-04-01 | End: 2025-04-11

## 2025-04-01 RX ORDER — MONTELUKAST SODIUM 10 MG/1
10 TABLET ORAL
Qty: 90 TABLET | Refills: 3 | Status: SHIPPED | OUTPATIENT
Start: 2025-04-01

## 2025-04-01 NOTE — ASSESSMENT & PLAN NOTE
Lesion.  Seems to have gotten bigger.  Recommend plastic surgery evaluation.  We have discussed this previously, but at this point it is much more important.  Lesion has gotten taller and bigger.  Orders:  •  Ambulatory Referral to Plastic Surgery; Future

## 2025-04-01 NOTE — PROGRESS NOTES
Name: Noel Bird      : 1965      MRN: [id number]  Encounter Provider: Alan Haynes MD  Encounter Date: 2025   Encounter department: [facility]  :  Assessment & Plan  Acute non-recurrent pansinusitis    Orders:  •  cefuroxime (CEFTIN) 500 mg tablet; Take 1 tablet (500 mg total) by mouth every 12 (twelve) hours for 10 days    Mild intermittent asthma without complication  Patient does have a history of asthma.  Currently, seems to be doing relatively well with this.  Would restart Singulair at 10 mg daily.  No other change.       Lesion of right nipple  Lesion.  Seems to have gotten bigger.  Recommend plastic surgery evaluation.  We have discussed this previously, but at this point it is much more important.  Lesion has gotten taller and bigger.  Orders:  •  Ambulatory Referral to Plastic Surgery; Future    Graves disease  Recommend follow-up with endocrinology as scheduled.  Continues on Tapazole.       Non-seasonal allergic rhinitis, unspecified trigger  Follow-up with visit in the future.  Restart Singulair.  Seems to help out for the allergy symptoms, which then would likely turn on the sinus infection that he has.  Orders:  •  montelukast (SINGULAIR) 10 mg tablet; Take 1 tablet (10 mg total) by mouth daily at bedtime          1. Acute non-recurrent pansinusitis  Comments:  Patient appears to have a sinus infection.  Ceftin.  Orders:  -     cefuroxime (CEFTIN) 500 mg tablet; Take 1 tablet (500 mg total) by mouth every 12 (twelve) hours for 10 days  2. Mild intermittent asthma without complication  Assessment & Plan:  Patient does have a history of asthma.  Currently, seems to be doing relatively well with this.  Would restart Singulair at 10 mg daily.  No other change.       3. Lesion of right nipple  Assessment & Plan:  Lesion.  Seems to have gotten bigger.  Recommend plastic surgery evaluation.  We have discussed this previously, but at this point it is much more  important.  Lesion has gotten taller and bigger.  Orders:  •  Ambulatory Referral to Plastic Surgery; Future    Orders:  -     Ambulatory Referral to Plastic Surgery; Future  4. Graves disease  Assessment & Plan:  Recommend follow-up with endocrinology as scheduled.  Continues on Tapazole.       5. Non-seasonal allergic rhinitis, unspecified trigger  Assessment & Plan:  Follow-up with visit in the future.  Restart Singulair.  Seems to help out for the allergy symptoms, which then would likely turn on the sinus infection that he has.  Orders:  •  montelukast (SINGULAIR) 10 mg tablet; Take 1 tablet (10 mg total) by mouth daily at bedtime    Orders:  -     montelukast (SINGULAIR) 10 mg tablet; Take 1 tablet (10 mg total) by mouth daily at bedtime      Chief Complaint   Patient presents with   • Cold Like Symptoms     Patient in office for nasal congestion, headache, sinus pressure, post nasal drip onset a week. Patient tried otc medication without any relief.            History of Present Illness   Patient is currently having symptoms per the chief complaint.  A significant amount of postnasal drip, especially at night.  Denies any tooth pain, but he definitely has some facial discomfort.  Has been going on for about a week now.  Previously had sinus infection as well.    Seasonal allergies: Has been having problems for a while with this.  Currently using Claritin.  Has not been on Singulair lately.    Does notice some wheezing at night, but not a lot during the day.    Patient also has a lesion of the right nipple.  He mentioned that it seems to be growing more.  He would like to have that evaluated further.      Review of Systems   Constitutional:  Negative for appetite change, chills, diaphoresis, fatigue and fever.   HENT:  Positive for congestion, postnasal drip and sinus pressure. Negative for ear pain, facial swelling, rhinorrhea, sinus pain, sore throat, trouble swallowing and voice change.    Respiratory:   "Negative for cough, chest tightness, shortness of breath and wheezing.    Cardiovascular:  Negative for chest pain.   Gastrointestinal:  Negative for abdominal pain, diarrhea, nausea and vomiting.   Musculoskeletal:  Negative for myalgias.   Neurological:  Positive for headaches.       Objective   /88 (BP Location: Left arm, Patient Position: Sitting, Cuff Size: Adult)   Pulse 79   Temp 98.2 °F (36.8 °C) (Temporal)   Resp 16   Ht 5' 3\" (1.6 m)   Wt 70.2 kg (154 lb 12.8 oz)   SpO2 98%   BMI 27.42 kg/m²      Physical Exam  Vitals and nursing note reviewed.   Constitutional:       Appearance: Normal appearance. He is well-developed.   HENT:      Head: Normocephalic and atraumatic.   Cardiovascular:      Rate and Rhythm: Normal rate and regular rhythm.      Pulses:           Carotid pulses are 2+ on the right side and 2+ on the left side.     Heart sounds: Normal heart sounds. No murmur heard.     No friction rub. No gallop.   Pulmonary:      Effort: Pulmonary effort is normal. No respiratory distress.      Breath sounds: Normal breath sounds. No wheezing or rales.   Musculoskeletal:      Cervical back: Normal range of motion and neck supple.   Skin:         Neurological:      Mental Status: He is alert.         "

## 2025-04-01 NOTE — ASSESSMENT & PLAN NOTE
Patient does have a history of asthma.  Currently, seems to be doing relatively well with this.  Would restart Singulair at 10 mg daily.  No other change.

## 2025-04-01 NOTE — PATIENT INSTRUCTIONS
1. Acute non-recurrent pansinusitis  Comments:  Patient appears to have a sinus infection.  Ceftin.  Orders:  -     cefuroxime (CEFTIN) 500 mg tablet; Take 1 tablet (500 mg total) by mouth every 12 (twelve) hours for 10 days  2. Mild intermittent asthma without complication  Assessment & Plan:  Patient does have a history of asthma.  Currently, seems to be doing relatively well with this.  Would restart Singulair at 10 mg daily.  No other change.       3. Lesion of right nipple  Assessment & Plan:  Lesion.  Seems to have gotten bigger.  Recommend plastic surgery evaluation.  We have discussed this previously, but at this point it is much more important.  Lesion has gotten taller and bigger.  Orders:    Ambulatory Referral to Plastic Surgery; Future    Orders:  -     Ambulatory Referral to Plastic Surgery; Future  4. Graves disease  Assessment & Plan:  Recommend follow-up with endocrinology as scheduled.  Continues on Tapazole.       5. Non-seasonal allergic rhinitis, unspecified trigger  Assessment & Plan:  Follow-up with visit in the future.  Restart Singulair.  Seems to help out for the allergy symptoms, which then would likely turn on the sinus infection that he has.  Orders:    montelukast (SINGULAIR) 10 mg tablet; Take 1 tablet (10 mg total) by mouth daily at bedtime    Orders:  -     montelukast (SINGULAIR) 10 mg tablet; Take 1 tablet (10 mg total) by mouth daily at bedtime

## 2025-04-01 NOTE — ASSESSMENT & PLAN NOTE
Follow-up with visit in the future.  Restart Singulair.  Seems to help out for the allergy symptoms, which then would likely turn on the sinus infection that he has.  Orders:  •  montelukast (SINGULAIR) 10 mg tablet; Take 1 tablet (10 mg total) by mouth daily at bedtime

## 2025-04-03 ENCOUNTER — TELEPHONE (OUTPATIENT)
Age: 60
End: 2025-04-03

## 2025-04-03 NOTE — TELEPHONE ENCOUNTER
KENYM for pt in regards to referral we received for pt to be seen in our office with a PA. Advised pt to callback to schedule this appt if interested.

## 2025-04-04 DIAGNOSIS — J01.40 ACUTE NON-RECURRENT PANSINUSITIS: Primary | ICD-10-CM

## 2025-04-04 DIAGNOSIS — J45.20 MILD INTERMITTENT ASTHMA WITHOUT COMPLICATION: ICD-10-CM

## 2025-04-04 LAB — TSI SER-ACNC: 3.41 IU/L (ref 0–0.55)

## 2025-04-04 RX ORDER — METHYLPREDNISOLONE 4 MG/1
TABLET ORAL
Qty: 21 EACH | Refills: 0 | Status: SHIPPED | OUTPATIENT
Start: 2025-04-04

## 2025-04-11 NOTE — TELEPHONE ENCOUNTER
2nd call - LVM for pt in regard to referral we received for pt to be seen with a PA in our office. Advised pt to callback to schedule this appt if needed.

## 2025-04-28 DIAGNOSIS — E05.00 GRAVES DISEASE: ICD-10-CM

## 2025-04-30 ENCOUNTER — PATIENT MESSAGE (OUTPATIENT)
Dept: ENDOCRINOLOGY | Facility: CLINIC | Age: 60
End: 2025-04-30

## 2025-04-30 DIAGNOSIS — E05.00 GRAVES DISEASE: ICD-10-CM

## 2025-04-30 DIAGNOSIS — E05.90 HYPERTHYROIDISM: ICD-10-CM

## 2025-04-30 DIAGNOSIS — E05.90 HYPERTHYROIDISM: Primary | ICD-10-CM

## 2025-04-30 RX ORDER — METHIMAZOLE 5 MG/1
5 TABLET ORAL 2 TIMES DAILY
Qty: 60 TABLET | Refills: 3 | Status: SHIPPED | OUTPATIENT
Start: 2025-04-30

## 2025-04-30 RX ORDER — METOPROLOL TARTRATE 25 MG/1
25 TABLET, FILM COATED ORAL EVERY 12 HOURS SCHEDULED
Qty: 180 TABLET | Refills: 1 | Status: SHIPPED | OUTPATIENT
Start: 2025-04-30 | End: 2025-07-29

## 2025-04-30 RX ORDER — METOPROLOL TARTRATE 25 MG/1
25 TABLET, FILM COATED ORAL EVERY 12 HOURS SCHEDULED
Qty: 180 TABLET | Refills: 1 | Status: SHIPPED | OUTPATIENT
Start: 2025-04-30 | End: 2025-04-30 | Stop reason: SDUPTHER

## 2025-07-07 ENCOUNTER — APPOINTMENT (OUTPATIENT)
Dept: LAB | Facility: CLINIC | Age: 60
End: 2025-07-07
Payer: COMMERCIAL

## 2025-07-07 DIAGNOSIS — E05.00 GRAVES DISEASE: ICD-10-CM

## 2025-07-07 DIAGNOSIS — E05.90 HYPERTHYROIDISM: ICD-10-CM

## 2025-07-07 LAB
T4 FREE SERPL-MCNC: 0.74 NG/DL (ref 0.61–1.12)
TSH SERPL DL<=0.05 MIU/L-ACNC: 11.83 UIU/ML (ref 0.45–4.5)

## 2025-07-07 PROCEDURE — 84439 ASSAY OF FREE THYROXINE: CPT

## 2025-07-07 PROCEDURE — 36415 COLL VENOUS BLD VENIPUNCTURE: CPT

## 2025-07-07 PROCEDURE — 84443 ASSAY THYROID STIM HORMONE: CPT

## 2025-07-09 ENCOUNTER — OFFICE VISIT (OUTPATIENT)
Dept: ENDOCRINOLOGY | Facility: CLINIC | Age: 60
End: 2025-07-09
Payer: COMMERCIAL

## 2025-07-09 VITALS
OXYGEN SATURATION: 96 % | BODY MASS INDEX: 28.77 KG/M2 | HEART RATE: 75 BPM | DIASTOLIC BLOOD PRESSURE: 84 MMHG | SYSTOLIC BLOOD PRESSURE: 132 MMHG | WEIGHT: 162.4 LBS | HEIGHT: 63 IN

## 2025-07-09 DIAGNOSIS — E05.00 GRAVES DISEASE: Primary | ICD-10-CM

## 2025-07-09 PROCEDURE — 99214 OFFICE O/P EST MOD 30 MIN: CPT | Performed by: PHYSICIAN ASSISTANT

## 2025-07-09 RX ORDER — METHIMAZOLE 5 MG/1
7.5 TABLET ORAL DAILY
Qty: 45 TABLET | Refills: 3 | Status: SHIPPED | OUTPATIENT
Start: 2025-07-09

## 2025-07-09 NOTE — PROGRESS NOTES
"Name: Noel Bird      : 1965      MRN: [id number]  Encounter Provider: Cinthya Montero PA-C  Encounter Date: 2025   Encounter department: [facility] [place]    Chief Complaint   Patient presents with   • Hyperthyroidism   :  Assessment & Plan  Graves disease  TSH: 11.832  T4: 0.74  Currently on methimazole 5 mg twice daily  Reduce methimazole to 7.5mg daily  Repeat labs in 4 weeks with TSI  Consider further reduction/discontinuation in future  Orders:  •  methimazole (TAPAZOLE) 5 mg tablet; Take 1.5 tablets (7.5 mg total) by mouth in the morning  •  TSH + Free T4; Future  •  Thyroid stimulating immunoglobulin; Future          History of Present Illness {?Quick Links Encounters * My Last Note * Last Note in Specialty * Snapshot * Since Last Visit * History :[id number]}  History of Present Illness  Noel Bird is a 59 y.o. male with a past medical history of Graves' disease, hyperthyroidism who presents for follow-up.He reports experiencing fatigue and slight weight gain and cold intolerance which he attributes to his recent elevated TSH. He does not have any dysphagia, dyspnea, or voice changes. He frequently feels cold and occasionally extremely hot. The patient is currently on methimazole 5 mg BID and is open to reducing the dosage.. He does not experience any palpitations and is currently taking metoprolol.      Review of Systems as per HPI       Medical History Reviewed by provider this encounter:     .    Objective {?Quick Links Trend Vitals * Enter New Vitals * Results Review * Timeline (Adult) * Labs * Imaging * Cardiology * Procedures * Lung Cancer Screening * Surgical eConsent :[id number]}  /84   Pulse 75   Ht 5' 3\" (1.6 m)   Wt 73.7 kg (162 lb 6.4 oz)   SpO2 96%   BMI 28.77 kg/m²      Body mass index is 28.77 kg/m².  Wt Readings from Last 3 Encounters:   25 73.7 kg (162 lb 6.4 oz)   25 70.2 kg (154 lb 12.8 oz)   25 70.3 kg (155 lb) "       Physical Exam  General Appearance: well-developed, well-appearing, in no acute distress.  Vital signs: Within normal limits.  HEENT: Normocephalic and atraumatic.  Eyes: No scleral icterus. Conjunctivae normal.  Respiratory: Pulmonary effort is normal.  Neck: No abnormalities, masses, glandular enlargement, or tenderness.  Skin: Warm and dry, no rash.  Neurological: Alert.  Psychiatric: Attention normal.    Results    Labs: I have reviewed pertinent labs including:   Lab Results   Component Value Date    JEZ4WRJUMPPL 11.832 (H) 07/07/2025      Lab Results   Component Value Date    FREET4 0.74 07/07/2025    T3FREE 18.32 (H) 11/01/2023        Patient Instructions   Monitor for side effects of nausea, vomiting, rash, joint pain, itching, fever - please contact the office if these develop  Obtain labs to monitor your thyroid and liver prior to your next appointment    Discussed with the patient and all questioned fully answered. He will call me if any problems arise.

## 2025-07-09 NOTE — ASSESSMENT & PLAN NOTE
TSH: 11.832  T4: 0.74  Currently on methimazole 5 mg twice daily  Reduce methimazole to 7.5mg daily  Repeat labs in 4 weeks with TSI  Consider further reduction/discontinuation in future  Orders:  •  methimazole (TAPAZOLE) 5 mg tablet; Take 1.5 tablets (7.5 mg total) by mouth in the morning  •  TSH + Free T4; Future  •  Thyroid stimulating immunoglobulin; Future

## 2025-08-08 ENCOUNTER — APPOINTMENT (OUTPATIENT)
Dept: LAB | Facility: CLINIC | Age: 60
End: 2025-08-08
Payer: COMMERCIAL

## 2025-08-21 DIAGNOSIS — E05.00 GRAVES DISEASE: ICD-10-CM

## 2025-08-21 RX ORDER — METHIMAZOLE 5 MG/1
5 TABLET ORAL DAILY
Start: 2025-08-21